# Patient Record
Sex: FEMALE | Race: WHITE | Employment: PART TIME | URBAN - METROPOLITAN AREA
[De-identification: names, ages, dates, MRNs, and addresses within clinical notes are randomized per-mention and may not be internally consistent; named-entity substitution may affect disease eponyms.]

---

## 2017-01-31 ENCOUNTER — OFFICE VISIT (OUTPATIENT)
Dept: FAMILY MEDICINE CLINIC | Facility: CLINIC | Age: 47
End: 2017-01-31

## 2017-01-31 VITALS
SYSTOLIC BLOOD PRESSURE: 110 MMHG | HEART RATE: 102 BPM | RESPIRATION RATE: 16 BRPM | OXYGEN SATURATION: 98 % | DIASTOLIC BLOOD PRESSURE: 64 MMHG | TEMPERATURE: 98 F

## 2017-01-31 DIAGNOSIS — H10.9 CONJUNCTIVITIS OF LEFT EYE, UNSPECIFIED CONJUNCTIVITIS TYPE: Primary | ICD-10-CM

## 2017-01-31 PROCEDURE — 99213 OFFICE O/P EST LOW 20 MIN: CPT | Performed by: PHYSICIAN ASSISTANT

## 2017-01-31 RX ORDER — TOBRAMYCIN 3 MG/ML
SOLUTION/ DROPS OPHTHALMIC
Qty: 5 ML | Refills: 0 | Status: SHIPPED | OUTPATIENT
Start: 2017-01-31 | End: 2017-02-15

## 2017-01-31 NOTE — PROGRESS NOTES
CHIEF COMPLAINT:   No chief complaint on file. HPI:   Gabino Villagomez is a 51year old female who presents with chief complaint of  left \"pink eye\". Symptoms began  since this morning when she woke up. Symptoms have been stabel since onset.    Patient 01/10/2017  GENERAL: well developed, well nourished,in no apparent distress  SKIN: no rashes,no suspicious lesions  EYES: PERRLA, EOMI, + conjunctiva erythematous, injected, + discharge. No skin swelling or erythema around eyes.   intact vision to [employer]

## 2018-03-08 ENCOUNTER — OFFICE VISIT (OUTPATIENT)
Dept: INTEGRATIVE MEDICINE | Facility: HOSPITAL | Age: 48
End: 2018-03-08
Attending: GENERAL ACUTE CARE HOSPITAL

## 2018-03-08 DIAGNOSIS — M25.511 ACUTE PAIN OF RIGHT SHOULDER: Primary | ICD-10-CM

## 2018-03-12 ENCOUNTER — APPOINTMENT (OUTPATIENT)
Dept: INTEGRATIVE MEDICINE | Facility: HOSPITAL | Age: 48
End: 2018-03-12
Attending: GENERAL ACUTE CARE HOSPITAL

## 2018-03-14 NOTE — PROGRESS NOTES
[address] [place] [address] Patient presents right shoulder pain level =7 with sharp shooting pains. Pain is around LI 15 area in the lateral side of the shoulder joint.  Patient shares that she has a bone spur in the neck area a

## 2018-03-15 ENCOUNTER — OFFICE VISIT (OUTPATIENT)
Dept: INTEGRATIVE MEDICINE | Facility: HOSPITAL | Age: 48
End: 2018-03-15
Attending: GENERAL ACUTE CARE HOSPITAL

## 2018-03-15 DIAGNOSIS — M25.511 ACUTE PAIN OF RIGHT SHOULDER: Primary | ICD-10-CM

## 2018-03-16 ENCOUNTER — APPOINTMENT (OUTPATIENT)
Dept: INTEGRATIVE MEDICINE | Facility: HOSPITAL | Age: 48
End: 2018-03-16
Attending: GENERAL ACUTE CARE HOSPITAL

## 2018-03-22 NOTE — PROGRESS NOTES
[address] [facility] Adán came today reporting 15% less pain in the shoulder and her sleep was more rested about 20%.     Objective: Tongue: Pale coat, swollen, scallops and dry coat.   Pulse: JEVON and SP  and KD is deep

## 2019-10-22 PROBLEM — E55.9 VITAMIN D DEFICIENCY: Status: ACTIVE | Noted: 2019-10-22

## 2019-10-30 ENCOUNTER — OFFICE VISIT (OUTPATIENT)
Dept: INTERNAL MEDICINE CLINIC | Facility: CLINIC | Age: 49
End: 2019-10-30
Payer: COMMERCIAL

## 2019-10-30 VITALS
SYSTOLIC BLOOD PRESSURE: 118 MMHG | HEIGHT: 62 IN | WEIGHT: 183 LBS | BODY MASS INDEX: 33.68 KG/M2 | DIASTOLIC BLOOD PRESSURE: 76 MMHG | HEART RATE: 80 BPM | RESPIRATION RATE: 16 BRPM

## 2019-10-30 DIAGNOSIS — E66.9 OBESITY (BMI 30-39.9): ICD-10-CM

## 2019-10-30 DIAGNOSIS — Z51.81 THERAPEUTIC DRUG MONITORING: Primary | ICD-10-CM

## 2019-10-30 PROCEDURE — 93000 ELECTROCARDIOGRAM COMPLETE: CPT | Performed by: INTERNAL MEDICINE

## 2019-10-30 PROCEDURE — 99204 OFFICE O/P NEW MOD 45 MIN: CPT | Performed by: INTERNAL MEDICINE

## 2019-10-30 RX ORDER — PHENTERMINE HYDROCHLORIDE 15 MG/1
15 CAPSULE ORAL EVERY MORNING
Qty: 30 CAPSULE | Refills: 1 | Status: SHIPPED | OUTPATIENT
Start: 2019-10-30 | End: 2020-01-08

## 2019-10-30 NOTE — PATIENT INSTRUCTIONS
Plan:  Continue with medications:   Go to the lab for blood work   Follow up with me in 1 month  Schedule follow up appointments: Santiago Mckay (dietitian) or Gabriel Camejo (dietitian)   Check for insurance coverage for dietitian and labwork prior to sche sodium issues)   -hummus with vegetables  -bean dip with vegetables    FRUIT  Low carb fruit options   Raspberries: Half a cup (60 grams) contains 3 grams of carbs. Blackberries: Half a cup (70 grams) contains 4 grams of carbs.   Strawberries: Half a cup (

## 2019-10-30 NOTE — PROGRESS NOTES
HISTORY OF PRESENT ILLNESS  Patient presents with:  Weight Problem: work for [address], frankie Aggarwal Parrish is a 47year old female new to our office today for initiation of medical weight loss program.  Patient presents today with c/o exces CARDIOVASCULAR: denies chest pain on exertion , denies palpitations or pedal edema  GI: denies abdominal pain.   No N/V/D/C  MUSCULOSKELETAL: denies joint pains   NEURO: denies headaches   PSYCH: denies change in behavior or mood, denies feeling sad or depr Bernard Kwok 168.00 10/15/2019    TRIG 50.00 10/15/2019    HDL 59 10/15/2019    LDL 99 10/15/2019    VLDL 13 03/21/2015    CHOLHDLRATIO 2.4 09/09/2011     Lab Results   Component Value Date    TSH 1.288 10/15/2019     Lab Results   Component Value Date    VITB Schedule follow up appointments: Eli Flores (dietitian) or Lane Serrano (dietitian)   Check for insurance coverage for dietitian and labwork prior to scheduling appointment. Please try to work on the following dietary changes:  1.  Drink lots of gary Low carb fruit options   Raspberries: Half a cup (60 grams) contains 3 grams of carbs. Blackberries: Half a cup (70 grams) contains 4 grams of carbs. Strawberries: Half a cup (100 grams) contains 6 grams of carbs.   Blueberries: Half a cup (50 grams) cont

## 2019-11-12 ENCOUNTER — TELEPHONE (OUTPATIENT)
Dept: INTERNAL MEDICINE CLINIC | Facility: CLINIC | Age: 49
End: 2019-11-12

## 2019-11-12 NOTE — TELEPHONE ENCOUNTER
Patient called back had labs drawn 11/6 at [address] [place] [address] location. [phone]. Called lab, [phone]  To get results faxed. Awaiting results.

## 2019-11-27 ENCOUNTER — TELEPHONE (OUTPATIENT)
Dept: INTERNAL MEDICINE CLINIC | Facility: CLINIC | Age: 49
End: 2019-11-27

## 2019-11-27 NOTE — TELEPHONE ENCOUNTER
Medical records request received from [facility]    Sent to [address] STAT

## 2019-12-17 ENCOUNTER — TELEPHONE (OUTPATIENT)
Dept: INTERNAL MEDICINE CLINIC | Facility: CLINIC | Age: 49
End: 2019-12-17

## 2020-01-02 RX ORDER — PHENTERMINE HYDROCHLORIDE 15 MG/1
CAPSULE ORAL
Qty: 30 CAPSULE | Refills: 0 | OUTPATIENT
Start: 2020-01-02

## 2020-01-02 NOTE — TELEPHONE ENCOUNTER
Requesting Phentermine  LOV: 10/30/19  RTC: one month  Last Relevant Labs: na  Filled: 10/30/19 #30 with 1 refills  Last filled 11[place]29/19 [place]    Future Appointments   Date Time Provider Shemar Vann   1/8/2020  8:20 AM MD GEN Godoy [facility]   2/3/2020  8:00 AM MD GEN Godoy [facility]   3/3/2020  8:00 AM Eva Fink MD EMG[facility]     Patient cancelled her 12/3/19 appointment. Can I deny?

## 2020-01-08 ENCOUNTER — TELEPHONE (OUTPATIENT)
Dept: INTERNAL MEDICINE CLINIC | Facility: CLINIC | Age: 50
End: 2020-01-08

## 2020-01-08 ENCOUNTER — OFFICE VISIT (OUTPATIENT)
Dept: INTERNAL MEDICINE CLINIC | Facility: CLINIC | Age: 50
End: 2020-01-08
Payer: COMMERCIAL

## 2020-01-08 VITALS
RESPIRATION RATE: 16 BRPM | SYSTOLIC BLOOD PRESSURE: 120 MMHG | DIASTOLIC BLOOD PRESSURE: 80 MMHG | BODY MASS INDEX: 30.91 KG/M2 | HEART RATE: 80 BPM | HEIGHT: 62 IN | WEIGHT: 168 LBS

## 2020-01-08 DIAGNOSIS — Z51.81 THERAPEUTIC DRUG MONITORING: Primary | ICD-10-CM

## 2020-01-08 DIAGNOSIS — M25.511 RIGHT SHOULDER PAIN, UNSPECIFIED CHRONICITY: ICD-10-CM

## 2020-01-08 DIAGNOSIS — E66.9 OBESITY (BMI 30-39.9): ICD-10-CM

## 2020-01-08 DIAGNOSIS — Z72.3 LACK OF PHYSICAL EXERCISE: ICD-10-CM

## 2020-01-08 DIAGNOSIS — M54.6 RIGHT-SIDED THORACIC BACK PAIN, UNSPECIFIED CHRONICITY: ICD-10-CM

## 2020-01-08 PROCEDURE — 99214 OFFICE O/P EST MOD 30 MIN: CPT | Performed by: INTERNAL MEDICINE

## 2020-01-08 RX ORDER — PHENTERMINE HYDROCHLORIDE 15 MG/1
15 CAPSULE ORAL EVERY MORNING
Qty: 30 CAPSULE | Refills: 0 | Status: SHIPPED | OUTPATIENT
Start: 2020-01-08 | End: 2020-02-03

## 2020-01-08 NOTE — PROGRESS NOTES
HISTORY OF PRESENT ILLNESS  Patient presents with:  Weight Check: down 15 lb      Rock Madsen is a 47year old female here for follow up in medical weight loss program.     Denies chest pain, shortness of breath, dizziness, blurred vision, headache, pare 93 09/09/2011    GFRAA 108 09/09/2011    CA 9.5 09/09/2011    ALKPHO 73 10/15/2019    AST 20 10/15/2019    ALT 42 10/15/2019    BILT 0.47 10/15/2019    TP 7.2 10/15/2019    ALB 3.5 10/15/2019    GLOBULT 2.7 09/09/2011    AGRATIO 1.7 03/21/2015    ALBGLOBRA recommended. · Discussed the role of sleep and stress in weight management. · Counseled on comprehensive weight loss plan including attention to nutrition, exercise and behavior/stress management for success.  See patient instruction below for more detail

## 2020-01-08 NOTE — TELEPHONE ENCOUNTER
[1/8/2020 8:59 AM] Cass Steen:   Keren Jamil, [id number]  Female, 47year old, 09/24/1970   I forgot to discuss fitness with her  fitnessblender. com or fitsugar. com ( free)   barre[employer] (paid online)   can you send her a mychart with the websites

## 2020-02-03 ENCOUNTER — OFFICE VISIT (OUTPATIENT)
Dept: INTERNAL MEDICINE CLINIC | Facility: CLINIC | Age: 50
End: 2020-02-03
Payer: COMMERCIAL

## 2020-02-03 VITALS
RESPIRATION RATE: 16 BRPM | HEART RATE: 76 BPM | BODY MASS INDEX: 30 KG/M2 | WEIGHT: 163 LBS | SYSTOLIC BLOOD PRESSURE: 116 MMHG | DIASTOLIC BLOOD PRESSURE: 82 MMHG | HEIGHT: 62 IN

## 2020-02-03 DIAGNOSIS — Z51.81 THERAPEUTIC DRUG MONITORING: Primary | ICD-10-CM

## 2020-02-03 DIAGNOSIS — E66.9 OBESITY (BMI 30-39.9): ICD-10-CM

## 2020-02-03 PROCEDURE — 99214 OFFICE O/P EST MOD 30 MIN: CPT | Performed by: INTERNAL MEDICINE

## 2020-02-03 RX ORDER — PHENTERMINE HYDROCHLORIDE 15 MG/1
15 CAPSULE ORAL 2 TIMES DAILY
Qty: 60 CAPSULE | Refills: 0 | Status: SHIPPED | OUTPATIENT
Start: 2020-02-03 | End: 2020-03-03

## 2020-02-03 NOTE — PROGRESS NOTES
HISTORY OF PRESENT ILLNESS  Patient presents with:  Weight Check: down 5 lbs      Raciel White is a 47year old female here for follow up in medical weight loss program.     Denies chest pain, shortness of breath, dizziness, blurred vision, headache, pare 10/15/2019    ALT 42 10/15/2019    BILT 0.47 10/15/2019    TP 7.2 10/15/2019    ALB 3.5 10/15/2019    GLOBULT 2.7 09/09/2011    AGRATIO 1.7 03/21/2015    ALBGLOBRAT 1.6 09/09/2011     10/15/2019    K 4.40 10/15/2019     10/15/2019    CO2 26.4 1 and adverse effects of this medication  · Reviewed methods to incorporate exercise   · Nutrition: low carb diet/ recommended to eat breakfast daily/ regular protein intake  · Medication use and side effects reviewed with patient.   Medication contraindicati

## 2020-03-03 ENCOUNTER — OFFICE VISIT (OUTPATIENT)
Dept: INTERNAL MEDICINE CLINIC | Facility: CLINIC | Age: 50
End: 2020-03-03
Payer: COMMERCIAL

## 2020-03-03 VITALS
HEIGHT: 62 IN | HEART RATE: 88 BPM | DIASTOLIC BLOOD PRESSURE: 70 MMHG | BODY MASS INDEX: 28.89 KG/M2 | WEIGHT: 157 LBS | SYSTOLIC BLOOD PRESSURE: 102 MMHG

## 2020-03-03 DIAGNOSIS — E66.9 OBESITY (BMI 30-39.9): ICD-10-CM

## 2020-03-03 DIAGNOSIS — Z51.81 THERAPEUTIC DRUG MONITORING: Primary | ICD-10-CM

## 2020-03-03 PROCEDURE — 99213 OFFICE O/P EST LOW 20 MIN: CPT | Performed by: INTERNAL MEDICINE

## 2020-03-03 RX ORDER — PHENTERMINE HYDROCHLORIDE 15 MG/1
15 CAPSULE ORAL 2 TIMES DAILY
Qty: 60 CAPSULE | Refills: 1 | Status: SHIPPED | OUTPATIENT
Start: 2020-03-03 | End: 2020-05-08

## 2020-03-03 NOTE — PROGRESS NOTES
HISTORY OF PRESENT ILLNESS  Patient presents with:  Weight Check: 6# down      Yeni Body is a 47year old female here for follow up in medical weight loss program.     Denies chest pain, shortness of breath, dizziness, blurred vision, headache, paresth ALT 42 10/15/2019    BILT 0.47 10/15/2019    TP 7.2 10/15/2019    ALB 3.5 10/15/2019    GLOBULT 2.7 09/09/2011    AGRATIO 1.7 03/21/2015    ALBGLOBRAT 1.6 09/09/2011     10/15/2019    K 4.40 10/15/2019     10/15/2019    CO2 26.4 10/15/2019 exercise and behavior/stress management for success. See patient instruction below for more details.   · Discussed strategies to overcome barriers to successful weight loss and weight maintenance  · FITTE: ACSM recommendations (150-300 minutes/ week in acti

## 2020-05-08 ENCOUNTER — VIRTUAL PHONE E/M (OUTPATIENT)
Dept: INTERNAL MEDICINE CLINIC | Facility: CLINIC | Age: 50
End: 2020-05-08

## 2020-05-08 DIAGNOSIS — Z51.81 THERAPEUTIC DRUG MONITORING: Primary | ICD-10-CM

## 2020-05-08 DIAGNOSIS — E66.9 OBESITY (BMI 30-39.9): ICD-10-CM

## 2020-05-08 PROCEDURE — 99213 OFFICE O/P EST LOW 20 MIN: CPT | Performed by: INTERNAL MEDICINE

## 2020-05-08 RX ORDER — PHENTERMINE HYDROCHLORIDE 30 MG/1
30 CAPSULE ORAL EVERY MORNING
Qty: 30 CAPSULE | Refills: 1 | Status: SHIPPED | OUTPATIENT
Start: 2020-05-08 | End: 2020-05-08

## 2020-05-08 RX ORDER — PHENTERMINE HYDROCHLORIDE 15 MG/1
15 CAPSULE ORAL EVERY MORNING
Qty: 60 CAPSULE | Refills: 1 | Status: SHIPPED | OUTPATIENT
Start: 2020-05-08 | End: 2020-09-03

## 2020-05-08 NOTE — PROGRESS NOTES
[facility] Weight Management check in/follow up encounter  Virtual/Telephone Check-In    Edvin Current verbally consents to a Virtual/Telephone Check-In service on 05/08/20  Working from home currently since March.   Has been working from home for last

## 2020-05-08 NOTE — PATIENT INSTRUCTIONS
[employer] and [employer] live  Stay home stay fit!        Kyle Raman -Be your Best - youtube    "[employer]n jose

## 2020-06-26 DIAGNOSIS — Z78.9 PARTICIPANT IN HEALTH AND WELLNESS PLAN: Primary | ICD-10-CM

## 2020-07-03 ENCOUNTER — VIRTUAL PHONE E/M (OUTPATIENT)
Dept: INTERNAL MEDICINE CLINIC | Facility: CLINIC | Age: 50
End: 2020-07-03

## 2020-07-03 DIAGNOSIS — Z51.81 THERAPEUTIC DRUG MONITORING: Primary | ICD-10-CM

## 2020-07-03 DIAGNOSIS — E66.9 OBESITY (BMI 30-39.9): ICD-10-CM

## 2020-07-03 PROCEDURE — 99213 OFFICE O/P EST LOW 20 MIN: CPT | Performed by: PHYSICIAN ASSISTANT

## 2020-07-03 RX ORDER — PHENTERMINE HYDROCHLORIDE 30 MG/1
30 CAPSULE ORAL EVERY MORNING
Qty: 30 CAPSULE | Refills: 1 | Status: SHIPPED | OUTPATIENT
Start: 2020-07-03 | End: 2020-09-03

## 2020-07-03 NOTE — PROGRESS NOTES
Virtual Telephone Check-In    Nay Quijano verbally consents to a Virtual/Telephone Check-In visit on 7/3/2020. Patient understands and accepts financial responsibility for any deductible, co-insurance and/or co-pays associated with this service.     Du monitoring    Obesity (BMI 30-39. 9)    Other orders  -     Phentermine HCl 30 MG Oral Cap; Take 1 capsule (30 mg total) by mouth every morning.       Continue with medications:   · Will continue phentermine  · --advised of side effects and adverse effects o

## 2020-09-03 ENCOUNTER — OFFICE VISIT (OUTPATIENT)
Dept: INTERNAL MEDICINE CLINIC | Facility: CLINIC | Age: 50
End: 2020-09-03
Payer: COMMERCIAL

## 2020-09-03 VITALS
RESPIRATION RATE: 16 BRPM | BODY MASS INDEX: 25.76 KG/M2 | SYSTOLIC BLOOD PRESSURE: 116 MMHG | WEIGHT: 140 LBS | TEMPERATURE: 96 F | HEART RATE: 102 BPM | DIASTOLIC BLOOD PRESSURE: 76 MMHG | HEIGHT: 62 IN

## 2020-09-03 DIAGNOSIS — E66.9 OBESITY (BMI 30-39.9): ICD-10-CM

## 2020-09-03 DIAGNOSIS — Z51.81 THERAPEUTIC DRUG MONITORING: Primary | ICD-10-CM

## 2020-09-03 PROCEDURE — 3008F BODY MASS INDEX DOCD: CPT | Performed by: INTERNAL MEDICINE

## 2020-09-03 PROCEDURE — 99213 OFFICE O/P EST LOW 20 MIN: CPT | Performed by: INTERNAL MEDICINE

## 2020-09-03 PROCEDURE — 3074F SYST BP LT 130 MM HG: CPT | Performed by: INTERNAL MEDICINE

## 2020-09-03 PROCEDURE — 3078F DIAST BP <80 MM HG: CPT | Performed by: INTERNAL MEDICINE

## 2020-09-03 RX ORDER — PHENTERMINE HYDROCHLORIDE 15 MG/1
15 CAPSULE ORAL EVERY MORNING
Qty: 60 CAPSULE | Refills: 1 | Status: CANCELLED | OUTPATIENT
Start: 2020-09-03

## 2020-09-03 RX ORDER — PHENTERMINE HYDROCHLORIDE 30 MG/1
30 CAPSULE ORAL EVERY MORNING
Qty: 30 CAPSULE | Refills: 1 | Status: CANCELLED | OUTPATIENT
Start: 2020-09-03

## 2020-09-03 RX ORDER — PHENTERMINE HYDROCHLORIDE 37.5 MG/1
37.5 TABLET ORAL
Qty: 30 TABLET | Refills: 1 | Status: SHIPPED | OUTPATIENT
Start: 2020-09-03 | End: 2020-11-04

## 2020-09-03 NOTE — PROGRESS NOTES
HISTORY OF PRESENT ILLNESS  Patient presents with:  Weight Check: lost 17 pounds      Sean Joseph is a 47year old female here for follow up in medical weight loss program.     Denies chest pain, shortness of breath, dizziness, blurred vision, headache, 10/15/2019    GFR >59 11/30/2009    GFRNAA 93 09/09/2011    GFRAA 108 09/09/2011    CA 9.5 09/09/2011    ALKPHO 73 10/15/2019    AST 20 10/15/2019    ALT 42 10/15/2019    BILT 0.47 10/15/2019    TP 7.2 10/15/2019    ALB 3.5 10/15/2019    GLOBULT 2.7 09/09/ effects and adverse effects of this medication  · Discussed home workout options / as well strength training  · Reviewed methods to incorporate exercise, currently at once per day  · Nutrition: low carb diet/ recommended to eat breakfast daily/ regular pro

## 2020-11-04 ENCOUNTER — OFFICE VISIT (OUTPATIENT)
Dept: INTERNAL MEDICINE CLINIC | Facility: CLINIC | Age: 50
End: 2020-11-04
Payer: COMMERCIAL

## 2020-11-04 VITALS
HEART RATE: 80 BPM | RESPIRATION RATE: 14 BRPM | HEIGHT: 63 IN | WEIGHT: 137 LBS | SYSTOLIC BLOOD PRESSURE: 120 MMHG | DIASTOLIC BLOOD PRESSURE: 68 MMHG | BODY MASS INDEX: 24.27 KG/M2

## 2020-11-04 DIAGNOSIS — E66.9 OBESITY (BMI 30-39.9): ICD-10-CM

## 2020-11-04 DIAGNOSIS — Z51.81 THERAPEUTIC DRUG MONITORING: Primary | ICD-10-CM

## 2020-11-04 PROCEDURE — 3008F BODY MASS INDEX DOCD: CPT | Performed by: INTERNAL MEDICINE

## 2020-11-04 PROCEDURE — 99072 ADDL SUPL MATRL&STAF TM PHE: CPT | Performed by: INTERNAL MEDICINE

## 2020-11-04 PROCEDURE — 99213 OFFICE O/P EST LOW 20 MIN: CPT | Performed by: INTERNAL MEDICINE

## 2020-11-04 PROCEDURE — 3078F DIAST BP <80 MM HG: CPT | Performed by: INTERNAL MEDICINE

## 2020-11-04 PROCEDURE — 3074F SYST BP LT 130 MM HG: CPT | Performed by: INTERNAL MEDICINE

## 2020-11-04 RX ORDER — PHENTERMINE HYDROCHLORIDE 37.5 MG/1
37.5 TABLET ORAL
Qty: 30 TABLET | Refills: 1 | Status: SHIPPED | OUTPATIENT
Start: 2020-11-04 | End: 2021-01-06

## 2020-11-04 NOTE — PROGRESS NOTES
HISTORY OF PRESENT ILLNESS  Patient presents with:  Weight Check: down 3      Ita Seo is a 45year old female here for follow up in medical weight loss program.     Denies chest pain, shortness of breath, dizziness, blurred vision, headache, parest ALB 3.5 10/15/2019    GLOBULT 2.7 09/09/2011    AGRATIO 1.7 03/21/2015    ALBGLOBRAT 1.6 09/09/2011     10/15/2019    K 4.40 10/15/2019     10/15/2019    CO2 26.4 10/15/2019     No results found for: EAG, A1C  Lab Results   Component Value D in weight management. · Counseled on comprehensive weight loss plan including attention to nutrition, exercise and behavior/stress management for success. See patient instruction below for more details.   · Discussed strategies to overcome barriers to succ

## 2020-12-19 ENCOUNTER — IMMUNIZATION (OUTPATIENT)
Dept: LAB | Facility: HOSPITAL | Age: 50
End: 2020-12-19
Attending: PREVENTIVE MEDICINE
Payer: COMMERCIAL

## 2020-12-19 DIAGNOSIS — Z23 NEED FOR VACCINATION: ICD-10-CM

## 2020-12-19 PROCEDURE — 0001A PFIZER-BIONTECH COVID-19 VACCINE: CPT

## 2021-01-06 ENCOUNTER — OFFICE VISIT (OUTPATIENT)
Dept: INTERNAL MEDICINE CLINIC | Facility: CLINIC | Age: 51
End: 2021-01-06
Payer: COMMERCIAL

## 2021-01-06 VITALS
HEART RATE: 84 BPM | BODY MASS INDEX: 25.76 KG/M2 | WEIGHT: 140 LBS | DIASTOLIC BLOOD PRESSURE: 78 MMHG | HEIGHT: 62 IN | SYSTOLIC BLOOD PRESSURE: 120 MMHG | RESPIRATION RATE: 16 BRPM

## 2021-01-06 DIAGNOSIS — Z51.81 THERAPEUTIC DRUG MONITORING: Primary | ICD-10-CM

## 2021-01-06 DIAGNOSIS — E66.9 OBESITY (BMI 30-39.9): ICD-10-CM

## 2021-01-06 PROCEDURE — 99213 OFFICE O/P EST LOW 20 MIN: CPT | Performed by: INTERNAL MEDICINE

## 2021-01-06 PROCEDURE — 3008F BODY MASS INDEX DOCD: CPT | Performed by: INTERNAL MEDICINE

## 2021-01-06 PROCEDURE — 3074F SYST BP LT 130 MM HG: CPT | Performed by: INTERNAL MEDICINE

## 2021-01-06 PROCEDURE — 3078F DIAST BP <80 MM HG: CPT | Performed by: INTERNAL MEDICINE

## 2021-01-06 RX ORDER — PHENTERMINE HYDROCHLORIDE 37.5 MG/1
37.5 TABLET ORAL
Qty: 30 TABLET | Refills: 1 | Status: SHIPPED | OUTPATIENT
Start: 2021-01-06 | End: 2021-04-27

## 2021-01-06 NOTE — PROGRESS NOTES
HISTORY OF PRESENT ILLNESS  Patient presents with:  Weight Check: up 3 lb      Meredith Bush is a 45year old female here for follow up in medical weight loss program.     Denies chest pain, shortness of breath, dizziness, blurred vision, headache, pares Lab Results   Component Value Date    GLU 88 11/17/2020    BUN 13.0 11/17/2020    BUNCREA 19.0 11/17/2020    CREATSERUM 0.69 11/17/2020    GFR >59 11/30/2009    GFRNAA 93 09/09/2011    GFRAA 108 09/09/2011    CA 9.4 11/17/2020    ALKPHO 82 11/17/2020 doing amazing even with high stress and outside factors. · Congratulated patient and advised to keep up the awesome work!   · Reviewed dietitian referral   · [facility] working on more protein, fiber, fruits, vegetables  · Nutrition: low carb

## 2021-01-06 NOTE — PATIENT INSTRUCTIONS
These macronutrient values reflect your maintenance calories of 1,580 calories per day.       Moderate Carb (30/35/35)  118g  protein  61g  fats  138g  carbs    Lower Carb (40/40/20)  158g  protein  70g  fats  79g  carbs    Higher Carb (30/20/50)  118g  pro

## 2021-01-09 ENCOUNTER — IMMUNIZATION (OUTPATIENT)
Dept: LAB | Facility: HOSPITAL | Age: 51
End: 2021-01-09
Attending: PREVENTIVE MEDICINE

## 2021-01-09 DIAGNOSIS — Z23 NEED FOR VACCINATION: ICD-10-CM

## 2021-01-09 PROCEDURE — 0002A SARSCOV2 VAC 30MCG/0.3ML IM: CPT

## 2021-01-16 DIAGNOSIS — E66.9 OBESITY (BMI 30-39.9): ICD-10-CM

## 2021-01-16 DIAGNOSIS — Z51.81 THERAPEUTIC DRUG MONITORING: ICD-10-CM

## 2021-01-17 RX ORDER — PHENTERMINE HYDROCHLORIDE 37.5 MG/1
TABLET ORAL
Qty: 30 TABLET | Refills: 0 | OUTPATIENT
Start: 2021-01-17

## 2021-01-17 NOTE — TELEPHONE ENCOUNTER
Requesting Phentermine 37.5 mg  LOV: 1/6/21  RTC: 2 months  Last Relevant Labs: na  Filled: 1/6/21 #30 with 1 refills    Future Appointments   Date Time Provider Shemar Vann   2/16/2021  1:40 PM Ned Cornejo MD [facility]     Receipt confirme

## 2021-04-27 ENCOUNTER — OFFICE VISIT (OUTPATIENT)
Dept: INTERNAL MEDICINE CLINIC | Facility: CLINIC | Age: 51
End: 2021-04-27
Payer: COMMERCIAL

## 2021-04-27 VITALS
HEART RATE: 93 BPM | BODY MASS INDEX: 27.97 KG/M2 | SYSTOLIC BLOOD PRESSURE: 118 MMHG | OXYGEN SATURATION: 98 % | TEMPERATURE: 97 F | WEIGHT: 152 LBS | DIASTOLIC BLOOD PRESSURE: 70 MMHG | RESPIRATION RATE: 16 BRPM | HEIGHT: 62 IN

## 2021-04-27 DIAGNOSIS — E66.9 OBESITY (BMI 30-39.9): ICD-10-CM

## 2021-04-27 DIAGNOSIS — Z51.81 THERAPEUTIC DRUG MONITORING: Primary | ICD-10-CM

## 2021-04-27 DIAGNOSIS — Z51.81 THERAPEUTIC DRUG MONITORING: ICD-10-CM

## 2021-04-27 PROCEDURE — 3078F DIAST BP <80 MM HG: CPT | Performed by: INTERNAL MEDICINE

## 2021-04-27 PROCEDURE — 3074F SYST BP LT 130 MM HG: CPT | Performed by: INTERNAL MEDICINE

## 2021-04-27 PROCEDURE — 3008F BODY MASS INDEX DOCD: CPT | Performed by: INTERNAL MEDICINE

## 2021-04-27 PROCEDURE — 99213 OFFICE O/P EST LOW 20 MIN: CPT | Performed by: INTERNAL MEDICINE

## 2021-04-27 RX ORDER — PHENTERMINE HYDROCHLORIDE 37.5 MG/1
TABLET ORAL
Qty: 30 TABLET | Refills: 0 | Status: SHIPPED | OUTPATIENT
Start: 2021-04-27 | End: 2021-05-26

## 2021-04-27 NOTE — PROGRESS NOTES
HISTORY OF PRESENT ILLNESS  Patient presents with:  Weight Check: up 12 pounds      Chaya Quintanilla is a 45year old female here for follow up in medical weight loss program.     Denies chest pain, shortness of breath, dizziness, blurred vision, headache, GFR >59 11/30/2009    GFRNAA 93 09/09/2011    GFRAA 108 09/09/2011    CA 9.4 11/17/2020    ALKPHO 82 11/17/2020    AST 19 11/17/2020    ALT 13 11/17/2020    BILT 0.59 11/17/2020    TP 7.3 11/17/2020    ALB 4.3 11/17/2020    GLOBULT 2.7 09/09/2011    AGRATI · Follow up with dietitian and psychologist as recommended. · Discussed the role of sleep and stress in weight management.   · Counseled on comprehensive weight loss plan including attention to nutrition, exercise and behavior/stress management for succe

## 2021-04-27 NOTE — TELEPHONE ENCOUNTER
Patient was seen today and it is noted to restart phentermine 37.5 mg. Not sent yet.   Forwarded to Dr. Steven Mtz

## 2021-05-26 DIAGNOSIS — E66.9 OBESITY (BMI 30-39.9): ICD-10-CM

## 2021-05-26 DIAGNOSIS — Z51.81 THERAPEUTIC DRUG MONITORING: ICD-10-CM

## 2021-05-26 RX ORDER — PHENTERMINE HYDROCHLORIDE 37.5 MG/1
TABLET ORAL
Qty: 30 TABLET | Refills: 0 | Status: SHIPPED | OUTPATIENT
Start: 2021-05-26 | End: 2021-06-15

## 2021-05-26 NOTE — TELEPHONE ENCOUNTER
Requesting Phentermine 37.5 mg  LOV: 4/27/21  RTC: 2 months  Last Relevant Labs: na  Filled: 4/27/21 #30 with 0 refills last filled 2[place]25/21 [place]    Future Appointments   Date Time Provider Shemar Vann   6/15/2021 12:20 PM Haydee Nunes

## 2021-06-15 ENCOUNTER — OFFICE VISIT (OUTPATIENT)
Dept: INTERNAL MEDICINE CLINIC | Facility: CLINIC | Age: 51
End: 2021-06-15
Payer: COMMERCIAL

## 2021-06-15 VITALS
RESPIRATION RATE: 16 BRPM | HEART RATE: 86 BPM | SYSTOLIC BLOOD PRESSURE: 118 MMHG | HEIGHT: 62 IN | DIASTOLIC BLOOD PRESSURE: 74 MMHG | BODY MASS INDEX: 27.42 KG/M2 | WEIGHT: 149 LBS

## 2021-06-15 DIAGNOSIS — Z51.81 THERAPEUTIC DRUG MONITORING: Primary | ICD-10-CM

## 2021-06-15 DIAGNOSIS — E66.9 OBESITY (BMI 30-39.9): ICD-10-CM

## 2021-06-15 DIAGNOSIS — Z72.3 LACK OF PHYSICAL EXERCISE: ICD-10-CM

## 2021-06-15 PROCEDURE — 3074F SYST BP LT 130 MM HG: CPT | Performed by: INTERNAL MEDICINE

## 2021-06-15 PROCEDURE — 99213 OFFICE O/P EST LOW 20 MIN: CPT | Performed by: INTERNAL MEDICINE

## 2021-06-15 PROCEDURE — 3078F DIAST BP <80 MM HG: CPT | Performed by: INTERNAL MEDICINE

## 2021-06-15 PROCEDURE — 3008F BODY MASS INDEX DOCD: CPT | Performed by: INTERNAL MEDICINE

## 2021-06-15 RX ORDER — PHENTERMINE HYDROCHLORIDE 37.5 MG/1
37.5 TABLET ORAL
Qty: 30 TABLET | Refills: 1 | Status: SHIPPED | OUTPATIENT
Start: 2021-06-15 | End: 2021-10-08

## 2021-06-15 NOTE — PROGRESS NOTES
HISTORY OF PRESENT ILLNESS  Patient presents with:  Weight Check: down 3 lbs      Kirt Doran is a 45year old female here for follow up in medical weight loss program.     Denies chest pain, shortness of breath, dizziness, blurred vision, headache, pa 11/17/2020    ALKPHO 82 11/17/2020    AST 19 11/17/2020    ALT 13 11/17/2020    BILT 0.59 11/17/2020    TP 7.3 11/17/2020    ALB 4.3 11/17/2020    GLOBULT 2.7 09/09/2011    AGRATIO 1.7 03/21/2015    ALBGLOBRAT 1.6 09/09/2011     11/17/2020    K 4.05 psychologist as recommended. · Discussed the role of sleep and stress in weight management. · Counseled on comprehensive weight loss plan including attention to nutrition, exercise and behavior/stress management for success.  See patient instruction below

## 2021-08-18 ENCOUNTER — TELEPHONE (OUTPATIENT)
Dept: INTERNAL MEDICINE CLINIC | Facility: HOSPITAL | Age: 51
End: 2021-08-18

## 2021-08-18 ENCOUNTER — TELEMEDICINE (OUTPATIENT)
Dept: INTERNAL MEDICINE CLINIC | Facility: CLINIC | Age: 51
End: 2021-08-18

## 2021-08-18 DIAGNOSIS — E66.9 OBESITY (BMI 30-39.9): ICD-10-CM

## 2021-08-18 DIAGNOSIS — Z20.822 SUSPECTED COVID-19 VIRUS INFECTION: Primary | ICD-10-CM

## 2021-08-18 DIAGNOSIS — Z51.81 THERAPEUTIC DRUG MONITORING: Primary | ICD-10-CM

## 2021-08-18 PROCEDURE — 99213 OFFICE O/P EST LOW 20 MIN: CPT | Performed by: INTERNAL MEDICINE

## 2021-08-18 RX ORDER — LIRAGLUTIDE 6 MG/ML
3 INJECTION, SOLUTION SUBCUTANEOUS DAILY
Qty: 5 EACH | Refills: 2 | Status: SHIPPED | OUTPATIENT
Start: 2021-08-18 | End: 2021-09-17

## 2021-08-18 RX ORDER — PEN NEEDLE, DIABETIC 30 GX3/16"
1 NEEDLE, DISPOSABLE MISCELLANEOUS DAILY
Qty: 90 EACH | Refills: 2 | Status: SHIPPED | OUTPATIENT
Start: 2021-08-18 | End: 2021-09-17

## 2021-08-18 NOTE — PATIENT INSTRUCTIONS
Saxenda dosing     Week 1- 0.6mg daily. Week 2- 1.2mg daily. Week 3- 1.8mg daily. Week 4- 2.4mg daily. Week 5- 3mg daily.       [email or web address]

## 2021-08-18 NOTE — PROGRESS NOTES
HISTORY OF PRESENT ILLNESS  Patient presents with:  Weight Check: video      Yeni Mena is a 45year old female here for follow up in medical weight loss program.     Denies chest pain, shortness of breath, dizziness, blurred vision, headache, paresth GFRAA 108 09/09/2011    CA 9.4 11/17/2020    ALKPHO 82 11/17/2020    AST 19 11/17/2020    ALT 13 11/17/2020    BILT 0.59 11/17/2020    TP 7.3 11/17/2020    ALB 4.3 11/17/2020    GLOBULT 2.7 09/09/2011    AGRATIO 1.7 03/21/2015    ALBGLOBRAT 1.6 09/09/2011 more protein, fiber, fruits, vegetables  · Nutrition: low carb diet/ recommended to eat breakfast daily/ regular protein intake  · Medication use and side effects reviewed with patient.   Medication contraindications: n/a  · Fitness resources given   · [employer]

## 2021-08-18 NOTE — TELEPHONE ENCOUNTER
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 8/23/21    Did you have close contact with someone on your unit while not wearing a mask? (e.g., during meal breaks):  Yes []   No [x]    If yes, who:   Do you share a workspace? Yes []   No [x]       If yes, with whom?    Do you hav COVID-19 testing ordered: [] Rapid    [x] Alinity    Date test is to be taken:    8/19/21    []  Outside testing       [x] Manager notified    INSTRUCTIONS PROVIDED:    [x]Employee was instructed to call Central scheduling at [phone] or use

## 2021-08-19 ENCOUNTER — NURSE ONLY (OUTPATIENT)
Dept: LAB | Facility: HOSPITAL | Age: 51
End: 2021-08-19
Attending: PREVENTIVE MEDICINE
Payer: COMMERCIAL

## 2021-08-19 DIAGNOSIS — Z20.822 SUSPECTED COVID-19 VIRUS INFECTION: ICD-10-CM

## 2021-08-20 LAB — SARS-COV-2 RNA RESP QL NAA+PROBE: NOT DETECTED

## 2021-08-21 NOTE — TELEPHONE ENCOUNTER
Results and RTW guidelines:    COVID RESULT GIVEN:      Test type:    [] Rapid         [x] Alinity      [x] NEGATIVE     Ordered Alinity retest?  []Yes   [x] No (skip to RTW)       Date ordered:             Dated to be taken:      If Yes, PLACE ORDER NOW instruction from hotline with Alinity results  INSTRUCTIONS PROVIDED:  [x]  Plan as noted above  []  Length of time to obtain results  []  Quarantine instructions  []  S/S of worsening infection/condition and importance of prompt medical re-evaluation incl

## 2021-09-13 ENCOUNTER — TELEPHONE (OUTPATIENT)
Dept: INTERNAL MEDICINE CLINIC | Facility: CLINIC | Age: 51
End: 2021-09-13

## 2021-09-13 NOTE — TELEPHONE ENCOUNTER
Pt called to [facility] nurse visit for injection teaching  Called pt and left voicemail

## 2021-09-15 ENCOUNTER — NURSE ONLY (OUTPATIENT)
Dept: INTERNAL MEDICINE CLINIC | Facility: CLINIC | Age: 51
End: 2021-09-15
Payer: COMMERCIAL

## 2021-11-11 ENCOUNTER — OFFICE VISIT (OUTPATIENT)
Dept: INTERNAL MEDICINE CLINIC | Facility: CLINIC | Age: 51
End: 2021-11-11
Payer: COMMERCIAL

## 2021-11-11 VITALS
RESPIRATION RATE: 16 BRPM | HEART RATE: 78 BPM | WEIGHT: 145 LBS | SYSTOLIC BLOOD PRESSURE: 118 MMHG | HEIGHT: 62 IN | BODY MASS INDEX: 26.68 KG/M2 | DIASTOLIC BLOOD PRESSURE: 68 MMHG

## 2021-11-11 DIAGNOSIS — F32.1 CURRENT MODERATE EPISODE OF MAJOR DEPRESSIVE DISORDER WITHOUT PRIOR EPISODE (HCC): ICD-10-CM

## 2021-11-11 DIAGNOSIS — Z51.81 THERAPEUTIC DRUG MONITORING: Primary | ICD-10-CM

## 2021-11-11 DIAGNOSIS — E66.9 OBESITY (BMI 30-39.9): ICD-10-CM

## 2021-11-11 PROCEDURE — 3074F SYST BP LT 130 MM HG: CPT | Performed by: INTERNAL MEDICINE

## 2021-11-11 PROCEDURE — 99214 OFFICE O/P EST MOD 30 MIN: CPT | Performed by: INTERNAL MEDICINE

## 2021-11-11 PROCEDURE — 3008F BODY MASS INDEX DOCD: CPT | Performed by: INTERNAL MEDICINE

## 2021-11-11 PROCEDURE — 3078F DIAST BP <80 MM HG: CPT | Performed by: INTERNAL MEDICINE

## 2021-11-11 RX ORDER — ALPRAZOLAM 0.25 MG/1
0.25 TABLET ORAL 2 TIMES DAILY PRN
Qty: 60 TABLET | Refills: 0 | Status: SHIPPED | OUTPATIENT
Start: 2021-11-11 | End: 2021-12-30

## 2021-11-11 RX ORDER — SEMAGLUTIDE 1 MG/.5ML
1 INJECTION, SOLUTION SUBCUTANEOUS WEEKLY
Qty: 2 ML | Refills: 0 | Status: SHIPPED | OUTPATIENT
Start: 2021-11-11 | End: 2021-12-30

## 2021-11-11 RX ORDER — BUPROPION HYDROCHLORIDE 150 MG/1
150 TABLET ORAL DAILY
Qty: 30 TABLET | Refills: 1 | Status: SHIPPED | OUTPATIENT
Start: 2021-11-11 | End: 2022-01-17

## 2021-11-11 NOTE — PROGRESS NOTES
Patient teaching on [facility] E[facility]OTT performed. Patient demonstrated back, all questions were answered and patient verbalized understanding.  TASHA

## 2021-11-11 NOTE — PROGRESS NOTES
HISTORY OF PRESENT ILLNESS  Patient presents with:  Weight Check: down 4 lb      Greta Mckeon is a 45year old female here for follow up in medical weight loss program.     Denies chest pain, shortness of breath, dizziness, blurred vision, headache, par GFRNAA 93 09/09/2011    GFRAA 108 09/09/2011    CA 9.4 10/08/2021    ALKPHO 83 10/08/2021    AST 16 10/08/2021    ALT 14 10/08/2021    BILT 0.28 10/08/2021    TP 6.9 10/08/2021    ALB 4.5 10/08/2021    GLOBULIN 2.7 09/09/2011    AGRATIO 1.7 03/21/2015 · Trial of wegovy   · -advised of side effects and adverse effects of this medication  · Trial of wellbutrin xl 150 mg q day   · -advised of side effects and adverse effects of this medication  · Also add xanax prn anxiety / sleep to get patient back on

## 2021-11-30 ENCOUNTER — OFFICE VISIT (OUTPATIENT)
Dept: INTERNAL MEDICINE CLINIC | Facility: CLINIC | Age: 51
End: 2021-11-30
Payer: COMMERCIAL

## 2021-11-30 VITALS
HEIGHT: 62 IN | BODY MASS INDEX: 26.5 KG/M2 | SYSTOLIC BLOOD PRESSURE: 114 MMHG | WEIGHT: 144 LBS | HEART RATE: 82 BPM | RESPIRATION RATE: 16 BRPM | DIASTOLIC BLOOD PRESSURE: 68 MMHG

## 2021-11-30 DIAGNOSIS — Z51.81 THERAPEUTIC DRUG MONITORING: Primary | ICD-10-CM

## 2021-11-30 DIAGNOSIS — E66.9 OBESITY (BMI 30-39.9): ICD-10-CM

## 2021-11-30 DIAGNOSIS — F32.1 CURRENT MODERATE EPISODE OF MAJOR DEPRESSIVE DISORDER WITHOUT PRIOR EPISODE (HCC): ICD-10-CM

## 2021-11-30 PROCEDURE — 3078F DIAST BP <80 MM HG: CPT | Performed by: INTERNAL MEDICINE

## 2021-11-30 PROCEDURE — 3008F BODY MASS INDEX DOCD: CPT | Performed by: INTERNAL MEDICINE

## 2021-11-30 PROCEDURE — 3074F SYST BP LT 130 MM HG: CPT | Performed by: INTERNAL MEDICINE

## 2021-11-30 PROCEDURE — 99213 OFFICE O/P EST LOW 20 MIN: CPT | Performed by: INTERNAL MEDICINE

## 2021-11-30 NOTE — PROGRESS NOTES
HISTORY OF PRESENT ILLNESS  Patient presents with:  Weight Check: down 1 lb      Gabino Villagomez is a 45year old female here for follow up in medical weight loss program.     Denies chest pain, shortness of breath, dizziness, blurred vision, headache, par GFRNAA 93 09/09/2011    GFRAA 108 09/09/2011    CA 9.4 10/08/2021    ALKPHO 83 10/08/2021    AST 16 10/08/2021    ALT 14 10/08/2021    BILT 0.28 10/08/2021    TP 6.9 10/08/2021    ALB 4.5 10/08/2021    GLOBULIN 2.7 09/09/2011    AGRATIO 1.7 03/21/2015    N wellbutrin xl 150 mg q day   · -advised of side effects and adverse effects of this medication  · Agree with EAP evaluation and tools and rediscuss therapist based on improvement if needed  · Discussed home workout options / as well strength training  · Re

## 2021-12-02 ENCOUNTER — TELEPHONE (OUTPATIENT)
Dept: INTERNAL MEDICINE CLINIC | Facility: CLINIC | Age: 51
End: 2021-12-02

## 2021-12-02 NOTE — TELEPHONE ENCOUNTER
Patient asking for coverage exception to be done on [address] is on her card  [phone]  [id number]

## 2021-12-02 NOTE — TELEPHONE ENCOUNTER
Verified with patient she wants wegovy 1mg/2ml  I called [employer] and spoke with ivy - she is faxing form to complete.   ID [id number]

## 2021-12-06 NOTE — TELEPHONE ENCOUNTER
Note from [employer] to give additional information about what has tried in Past  Phentermine 15 mg [id number]  Phentermine 30 mg 5/8/20 to 7/3/20  Phentermine 37.5 mg 9/3/20 to 6/15/21    saxenda 3 mg 8/18/21  Need to fax note with copy of meds tried (print the

## 2021-12-17 ENCOUNTER — IMMUNIZATION (OUTPATIENT)
Dept: LAB | Facility: HOSPITAL | Age: 51
End: 2021-12-17
Attending: EMERGENCY MEDICINE
Payer: COMMERCIAL

## 2021-12-17 DIAGNOSIS — Z23 NEED FOR VACCINATION: Primary | ICD-10-CM

## 2021-12-17 PROCEDURE — 0004A SARSCOV2 VAC 30MCG/0.3ML IM: CPT

## 2021-12-30 RX ORDER — ALPRAZOLAM 0.25 MG/1
TABLET ORAL
Qty: 60 TABLET | Refills: 0 | Status: SHIPPED | OUTPATIENT
Start: 2021-12-30 | End: 2022-01-25 | Stop reason: ALTCHOICE

## 2021-12-30 RX ORDER — SEMAGLUTIDE 1.7 MG/.75ML
1.7 INJECTION, SOLUTION SUBCUTANEOUS WEEKLY
Qty: 3 ML | Refills: 0 | Status: SHIPPED | OUTPATIENT
Start: 2021-12-30 | End: 2022-01-21

## 2022-01-17 RX ORDER — BUPROPION HYDROCHLORIDE 150 MG/1
TABLET ORAL
Qty: 30 TABLET | Refills: 1 | Status: SHIPPED | OUTPATIENT
Start: 2022-01-17 | End: 2022-01-25 | Stop reason: ALTCHOICE

## 2022-01-17 NOTE — TELEPHONE ENCOUNTER
Requesting   Requested Prescriptions     Pending Prescriptions Disp Refills   • BUPROPION 150 MG Oral Tablet 24 Hr [Pharmacy Med Name: BUPROPION HCL  MG TABLET] 30 tablet 1     Sig: TAKE 1 TABLET BY MOUTH EVERY DAY     LOV: 11/30/21  RTC: 6 weeks  Fi

## 2022-01-26 ENCOUNTER — OFFICE VISIT (OUTPATIENT)
Dept: INTERNAL MEDICINE CLINIC | Facility: CLINIC | Age: 52
End: 2022-01-26
Payer: COMMERCIAL

## 2022-01-26 VITALS
HEIGHT: 62 IN | WEIGHT: 145 LBS | DIASTOLIC BLOOD PRESSURE: 60 MMHG | SYSTOLIC BLOOD PRESSURE: 122 MMHG | BODY MASS INDEX: 26.68 KG/M2 | HEART RATE: 78 BPM | RESPIRATION RATE: 16 BRPM

## 2022-01-26 DIAGNOSIS — Z51.81 THERAPEUTIC DRUG MONITORING: Primary | ICD-10-CM

## 2022-01-26 DIAGNOSIS — E66.9 OBESITY (BMI 30-39.9): ICD-10-CM

## 2022-01-26 PROCEDURE — 3074F SYST BP LT 130 MM HG: CPT | Performed by: INTERNAL MEDICINE

## 2022-01-26 PROCEDURE — 99213 OFFICE O/P EST LOW 20 MIN: CPT | Performed by: INTERNAL MEDICINE

## 2022-01-26 PROCEDURE — 3008F BODY MASS INDEX DOCD: CPT | Performed by: INTERNAL MEDICINE

## 2022-01-26 PROCEDURE — 3078F DIAST BP <80 MM HG: CPT | Performed by: INTERNAL MEDICINE

## 2022-01-26 RX ORDER — SEMAGLUTIDE 2.4 MG/.75ML
2.4 INJECTION, SOLUTION SUBCUTANEOUS WEEKLY
Qty: 3 ML | Refills: 0 | Status: SHIPPED | OUTPATIENT
Start: 2022-01-26 | End: 2022-02-17

## 2022-01-26 RX ORDER — SEMAGLUTIDE 1.7 MG/.75ML
1.7 INJECTION, SOLUTION SUBCUTANEOUS WEEKLY
Qty: 3 ML | Refills: 0 | Status: SHIPPED | OUTPATIENT
Start: 2022-01-26 | End: 2022-02-17

## 2022-01-26 RX ORDER — PHENTERMINE HYDROCHLORIDE 37.5 MG/1
37.5 TABLET ORAL
Qty: 30 TABLET | Refills: 1 | Status: SHIPPED | OUTPATIENT
Start: 2022-01-26

## 2022-01-26 NOTE — PROGRESS NOTES
HISTORY OF PRESENT ILLNESS  Patient presents with:  Weight Check: up 1 lb      Vanderbilt Alpers is a 45year old female here for follow up in medical weight loss program.     Denies chest pain, shortness of breath, dizziness, blurred vision, headache, pares ALT 14 10/08/2021    BILT 0.28 10/08/2021    TP 6.9 10/08/2021    ALB 4.5 10/08/2021    GLOBULIN 2.7 09/09/2011    AGRATIO 1.7 03/21/2015     10/08/2021    K 4.64 10/08/2021     10/08/2021    CO2 26.3 10/08/2021     No results found for: EAG, A training  · Reviewed methods to incorporate exercise  · Reviewed overall patient doing amazing even with high stress and outside factors.    · Start working on more protein, fiber, fruits, vegetables  · Nutrition: low carb diet/ recommended to eat breakfast

## 2022-03-10 NOTE — TELEPHONE ENCOUNTER
Requesting Wegovy 1.7 mg  LOV: 1/26/22  RTC: not noted  Last Relevant Labs: na  Filled: 1/26/22 #3ml with 0 refills    Future Appointments   Date Time Provider Shemar Vann   3/24/2022  1:00 PM Vira Duke MD [facility]

## 2022-03-17 RX ORDER — SEMAGLUTIDE 1.7 MG/.75ML
1.7 INJECTION, SOLUTION SUBCUTANEOUS WEEKLY
Refills: 0 | OUTPATIENT
Start: 2022-03-17 | End: 2022-04-08

## 2022-03-18 ENCOUNTER — HOSPITAL ENCOUNTER (OUTPATIENT)
Dept: MAMMOGRAPHY | Age: 52
Discharge: HOME | End: 2022-03-18
Attending: OBSTETRICS & GYNECOLOGY
Payer: COMMERCIAL

## 2022-03-18 DIAGNOSIS — Z12.31 ENCOUNTER FOR SCREENING MAMMOGRAM FOR MALIGNANT NEOPLASM OF BREAST: ICD-10-CM

## 2022-03-18 PROCEDURE — 77063 BREAST TOMOSYNTHESIS BI: CPT | Performed by: OBSTETRICS & GYNECOLOGY

## 2022-03-18 PROCEDURE — 77067 SCR MAMMO BI INCL CAD: CPT | Performed by: OBSTETRICS & GYNECOLOGY

## 2022-03-24 ENCOUNTER — OFFICE VISIT (OUTPATIENT)
Dept: INTERNAL MEDICINE CLINIC | Facility: CLINIC | Age: 52
End: 2022-03-24
Payer: COMMERCIAL

## 2022-03-24 VITALS
RESPIRATION RATE: 16 BRPM | HEIGHT: 62 IN | WEIGHT: 134 LBS | DIASTOLIC BLOOD PRESSURE: 78 MMHG | SYSTOLIC BLOOD PRESSURE: 124 MMHG | BODY MASS INDEX: 24.66 KG/M2 | HEART RATE: 78 BPM

## 2022-03-24 DIAGNOSIS — Z51.81 THERAPEUTIC DRUG MONITORING: Primary | ICD-10-CM

## 2022-03-24 DIAGNOSIS — E66.9 OBESITY (BMI 30-39.9): ICD-10-CM

## 2022-03-24 PROCEDURE — 3078F DIAST BP <80 MM HG: CPT | Performed by: INTERNAL MEDICINE

## 2022-03-24 PROCEDURE — 3008F BODY MASS INDEX DOCD: CPT | Performed by: INTERNAL MEDICINE

## 2022-03-24 PROCEDURE — 99213 OFFICE O/P EST LOW 20 MIN: CPT | Performed by: INTERNAL MEDICINE

## 2022-03-24 PROCEDURE — 3074F SYST BP LT 130 MM HG: CPT | Performed by: INTERNAL MEDICINE

## 2022-03-24 RX ORDER — SEMAGLUTIDE 2.4 MG/.75ML
2.4 INJECTION, SOLUTION SUBCUTANEOUS WEEKLY
Qty: 3 ML | Refills: 2 | Status: SHIPPED | OUTPATIENT
Start: 2022-03-24 | End: 2022-04-15

## 2022-03-24 RX ORDER — SEMAGLUTIDE 2.4 MG/.75ML
2.4 INJECTION, SOLUTION SUBCUTANEOUS
COMMUNITY
Start: 2022-03-14

## 2022-03-24 RX ORDER — PHENTERMINE HYDROCHLORIDE 37.5 MG/1
37.5 TABLET ORAL
Qty: 30 TABLET | Refills: 1 | Status: SHIPPED | OUTPATIENT
Start: 2022-03-24

## 2022-04-28 ENCOUNTER — OFFICE VISIT (OUTPATIENT)
Dept: FAMILY MEDICINE CLINIC | Facility: CLINIC | Age: 52
End: 2022-04-28
Payer: COMMERCIAL

## 2022-04-28 VITALS
RESPIRATION RATE: 16 BRPM | DIASTOLIC BLOOD PRESSURE: 62 MMHG | TEMPERATURE: 99 F | SYSTOLIC BLOOD PRESSURE: 100 MMHG | HEART RATE: 60 BPM

## 2022-04-28 DIAGNOSIS — H10.33 ACUTE BACTERIAL CONJUNCTIVITIS OF BOTH EYES: Primary | ICD-10-CM

## 2022-04-28 PROCEDURE — 3078F DIAST BP <80 MM HG: CPT | Performed by: NURSE PRACTITIONER

## 2022-04-28 PROCEDURE — 99213 OFFICE O/P EST LOW 20 MIN: CPT | Performed by: NURSE PRACTITIONER

## 2022-04-28 PROCEDURE — 3074F SYST BP LT 130 MM HG: CPT | Performed by: NURSE PRACTITIONER

## 2022-04-28 RX ORDER — TOBRAMYCIN 3 MG/ML
SOLUTION/ DROPS OPHTHALMIC
Qty: 10 ML | Refills: 0 | Status: SHIPPED | OUTPATIENT
Start: 2022-04-28

## 2022-05-03 ENCOUNTER — OFFICE VISIT (OUTPATIENT)
Dept: FAMILY MEDICINE CLINIC | Facility: CLINIC | Age: 52
End: 2022-05-03
Payer: COMMERCIAL

## 2022-05-03 VITALS
SYSTOLIC BLOOD PRESSURE: 113 MMHG | TEMPERATURE: 98 F | OXYGEN SATURATION: 97 % | HEART RATE: 81 BPM | WEIGHT: 129.19 LBS | DIASTOLIC BLOOD PRESSURE: 65 MMHG | BODY MASS INDEX: 24 KG/M2 | RESPIRATION RATE: 18 BRPM

## 2022-05-03 DIAGNOSIS — J02.9 ACUTE PHARYNGITIS, UNSPECIFIED ETIOLOGY: Primary | ICD-10-CM

## 2022-05-03 LAB
CONTROL LINE PRESENT WITH A CLEAR BACKGROUND (YES/NO): YES YES/NO
KIT LOT #: NORMAL NUMERIC
STREP GRP A CUL-SCR: NEGATIVE

## 2022-05-03 PROCEDURE — 87081 CULTURE SCREEN ONLY: CPT | Performed by: NURSE PRACTITIONER

## 2022-05-03 PROCEDURE — 99213 OFFICE O/P EST LOW 20 MIN: CPT | Performed by: NURSE PRACTITIONER

## 2022-05-03 PROCEDURE — 87880 STREP A ASSAY W/OPTIC: CPT | Performed by: NURSE PRACTITIONER

## 2022-05-03 PROCEDURE — 3074F SYST BP LT 130 MM HG: CPT | Performed by: NURSE PRACTITIONER

## 2022-05-03 PROCEDURE — 3078F DIAST BP <80 MM HG: CPT | Performed by: NURSE PRACTITIONER

## 2022-05-03 RX ORDER — UBROGEPANT 100 MG/1
100 TABLET ORAL
COMMUNITY

## 2022-05-31 ENCOUNTER — OFFICE VISIT (OUTPATIENT)
Dept: FAMILY MEDICINE CLINIC | Facility: CLINIC | Age: 52
End: 2022-05-31
Payer: COMMERCIAL

## 2022-05-31 VITALS
RESPIRATION RATE: 18 BRPM | SYSTOLIC BLOOD PRESSURE: 120 MMHG | HEART RATE: 111 BPM | HEIGHT: 62 IN | BODY MASS INDEX: 23.07 KG/M2 | DIASTOLIC BLOOD PRESSURE: 80 MMHG | WEIGHT: 125.38 LBS | TEMPERATURE: 98 F | OXYGEN SATURATION: 98 %

## 2022-05-31 DIAGNOSIS — J02.9 SORE THROAT: Primary | ICD-10-CM

## 2022-05-31 LAB
CONTROL LINE PRESENT WITH A CLEAR BACKGROUND (YES/NO): YES YES/NO
STREP GRP A CUL-SCR: NEGATIVE

## 2022-05-31 PROCEDURE — 87880 STREP A ASSAY W/OPTIC: CPT | Performed by: NURSE PRACTITIONER

## 2022-05-31 PROCEDURE — 87081 CULTURE SCREEN ONLY: CPT | Performed by: NURSE PRACTITIONER

## 2022-05-31 PROCEDURE — 99213 OFFICE O/P EST LOW 20 MIN: CPT | Performed by: NURSE PRACTITIONER

## 2022-05-31 PROCEDURE — 3008F BODY MASS INDEX DOCD: CPT | Performed by: NURSE PRACTITIONER

## 2022-05-31 PROCEDURE — 3079F DIAST BP 80-89 MM HG: CPT | Performed by: NURSE PRACTITIONER

## 2022-05-31 PROCEDURE — 3074F SYST BP LT 130 MM HG: CPT | Performed by: NURSE PRACTITIONER

## 2022-05-31 RX ORDER — LIDOCAINE HYDROCHLORIDE 20 MG/ML
SOLUTION OROPHARYNGEAL
Qty: 100 ML | Refills: 0 | Status: SHIPPED | OUTPATIENT
Start: 2022-05-31

## 2022-06-01 LAB — SARS-COV-2 RNA RESP QL NAA+PROBE: NOT DETECTED

## 2022-06-23 ENCOUNTER — OFFICE VISIT (OUTPATIENT)
Dept: INTERNAL MEDICINE CLINIC | Facility: CLINIC | Age: 52
End: 2022-06-23
Payer: COMMERCIAL

## 2022-06-23 VITALS
RESPIRATION RATE: 16 BRPM | DIASTOLIC BLOOD PRESSURE: 60 MMHG | WEIGHT: 124 LBS | SYSTOLIC BLOOD PRESSURE: 110 MMHG | BODY MASS INDEX: 22.82 KG/M2 | HEIGHT: 62 IN | HEART RATE: 68 BPM

## 2022-06-23 DIAGNOSIS — Z51.81 THERAPEUTIC DRUG MONITORING: Primary | ICD-10-CM

## 2022-06-23 DIAGNOSIS — N95.1 PERIMENOPAUSAL: ICD-10-CM

## 2022-06-23 DIAGNOSIS — E66.9 OBESITY (BMI 30-39.9): ICD-10-CM

## 2022-06-23 PROCEDURE — 3078F DIAST BP <80 MM HG: CPT | Performed by: INTERNAL MEDICINE

## 2022-06-23 PROCEDURE — 3008F BODY MASS INDEX DOCD: CPT | Performed by: INTERNAL MEDICINE

## 2022-06-23 PROCEDURE — 99213 OFFICE O/P EST LOW 20 MIN: CPT | Performed by: INTERNAL MEDICINE

## 2022-06-23 PROCEDURE — 3074F SYST BP LT 130 MM HG: CPT | Performed by: INTERNAL MEDICINE

## 2022-06-23 RX ORDER — SEMAGLUTIDE 2.4 MG/.75ML
2.4 INJECTION, SOLUTION SUBCUTANEOUS WEEKLY
Qty: 9 ML | Refills: 0 | Status: SHIPPED | OUTPATIENT
Start: 2022-06-23 | End: 2022-09-09

## 2022-10-05 ENCOUNTER — TELEMEDICINE (OUTPATIENT)
Dept: INTERNAL MEDICINE CLINIC | Facility: CLINIC | Age: 52
End: 2022-10-05

## 2022-10-05 DIAGNOSIS — Z51.81 THERAPEUTIC DRUG MONITORING: Primary | ICD-10-CM

## 2022-10-05 DIAGNOSIS — E66.9 OBESITY (BMI 30-39.9): ICD-10-CM

## 2022-10-05 PROCEDURE — 99213 OFFICE O/P EST LOW 20 MIN: CPT | Performed by: INTERNAL MEDICINE

## 2022-10-07 ENCOUNTER — HOSPITAL ENCOUNTER (OUTPATIENT)
Dept: MAMMOGRAPHY | Age: 52
Discharge: HOME | End: 2022-10-07
Attending: OBSTETRICS & GYNECOLOGY
Payer: COMMERCIAL

## 2022-10-07 DIAGNOSIS — R92.8 ABNORMAL MAMMOGRAM: ICD-10-CM

## 2022-10-07 PROCEDURE — 77066 DX MAMMO INCL CAD BI: CPT | Performed by: OBSTETRICS & GYNECOLOGY

## 2022-10-07 PROCEDURE — 77062 BREAST TOMOSYNTHESIS BI: CPT | Performed by: OBSTETRICS & GYNECOLOGY

## 2022-10-09 NOTE — TELEPHONE ENCOUNTER
Requesting wegovy 2.4 mg  LOV: 10/5/22  RTC: not noted  Last Relevant Labs: na  Filled: 6/23/22 #9ml with 0 refills    1/17/2023  8:20 AM Teresita Lovett MD [facility]

## 2022-10-10 ENCOUNTER — IMMUNIZATION (OUTPATIENT)
Dept: LAB | Facility: HOSPITAL | Age: 52
End: 2022-10-10
Attending: PREVENTIVE MEDICINE
Payer: COMMERCIAL

## 2022-10-10 DIAGNOSIS — Z23 NEED FOR VACCINATION: Primary | ICD-10-CM

## 2022-10-10 PROCEDURE — 90471 IMMUNIZATION ADMIN: CPT

## 2022-10-10 RX ORDER — SEMAGLUTIDE 2.4 MG/.75ML
INJECTION, SOLUTION SUBCUTANEOUS
Qty: 9 ML | Refills: 0 | Status: SHIPPED | OUTPATIENT
Start: 2022-10-10

## 2022-10-11 RX ORDER — SEMAGLUTIDE 2.4 MG/.75ML
2.4 INJECTION, SOLUTION SUBCUTANEOUS WEEKLY
Qty: 4 EACH | Refills: 2 | Status: SHIPPED | OUTPATIENT
Start: 2022-10-11

## 2022-10-11 RX ORDER — PHENTERMINE HYDROCHLORIDE 37.5 MG/1
37.5 TABLET ORAL
Qty: 30 TABLET | Refills: 2 | Status: SHIPPED | OUTPATIENT
Start: 2022-10-11

## 2022-12-02 ENCOUNTER — LAB ENCOUNTER (OUTPATIENT)
Dept: LAB | Facility: HOSPITAL | Age: 52
End: 2022-12-02
Attending: PREVENTIVE MEDICINE
Payer: COMMERCIAL

## 2022-12-02 ENCOUNTER — TELEPHONE (OUTPATIENT)
Dept: INTERNAL MEDICINE CLINIC | Facility: HOSPITAL | Age: 52
End: 2022-12-02

## 2022-12-02 DIAGNOSIS — Z20.822 SUSPECTED 2019 NOVEL CORONAVIRUS INFECTION: ICD-10-CM

## 2022-12-02 DIAGNOSIS — Z20.822 SUSPECTED 2019 NOVEL CORONAVIRUS INFECTION: Primary | ICD-10-CM

## 2022-12-02 LAB — SARS-COV-2 RNA RESP QL NAA+PROBE: DETECTED

## 2022-12-03 NOTE — TELEPHONE ENCOUNTER
Results and RTW guidelines:    COVID RESULT:    [x] Viewed by employee in [address] RTW plan and instructions as indicated on triage call. Manager notified. Estimated RTW date: 12/5  [] Discussed with employee   [x] Unable to reach by phone. Sent via [place] message      Test type:    [x] Rapid         [] Alinity         [] Outside test:     [x] Positive     - Employee should quarantine at home for at least 5 days (day 1 is day after sx onset) , follow the CDC guidelines for cleaning and                              quarantining; see CDC.gov   -This employee may RTW on day 6 if asymptomatic or mildly symptomatic (with improving symptoms). Call Employee Health on day 5 if unable to return on day 6 after                      symptom onset.    -This employee needs to call Employee Health on day 5 after symptom onset. The employee needs to be cleared by Employee Health. - Monitor symptoms and temperature                 - Notify PCP of result                 - Seek emergent care with worsening symptoms   - If employee is still experiencing severe symptoms on day 5 must make a RTW appt with Employee [facility], Employee will not be cleared if:    1. Has consistent cough, shortness of breath or fatigue that restricts your physical activities    2. Is still feeling \"unwell\"    3. Within 15 days of hospitalization for COVID    4. Within 20 days of intubation for COVID    5.  Still has a fever, vomiting or diarrhea   - Keep communication open with management about RTW and if symptoms worsen                - If outside testing completed, bring a copy of result to RTW appointment           Notes:     RTW PLAN:    [x]  If COVID positive results, off work minimum of 5 days from positive test or onset of symptoms (day 0)        On day 5, if asymptomatic or mildly symptomatic (with improving symptoms) may return to work day 6          On day 5, if symptomatic, call Employee Health for RTW screening        []  COVID positive result - call Employee Health on day 5 after symptom onset. The employee needs to be cleared by Employee Health to RTW. [] RTW immediately, continue to monitor for sx  [] RTW when sx improve; must be fever free for 24 hours w/o medications, Diarrhea/Vomiting free for 24 hours w/o medications  [] Alinity ordered; continue to monitor sx and call for new/worsening sx.   Discuss RTW guidelines with manager  [] May continue to work  [] Follow up with PCP  [] Home until further instruction from hotline with Alinity results  INSTRUCTIONS PROVIDED:  [x]  Plan as noted above  []  Length of time to obtain results   [x]  Quarantine instructions  [x]  Masking protocol   [x]  S/S of worsening infection/condition and importance of prompt medical re-evaluation including when to seek emergency care  [] If symptoms develop, stay home and call hotline for rapid test order    Estimated RTW date:  12/5    [] The employee voiced understanding of above plan/instructions  [x] Manager Notified

## 2023-02-18 ENCOUNTER — APPOINTMENT (OUTPATIENT)
Dept: GENERAL RADIOLOGY | Age: 53
End: 2023-02-18
Attending: EMERGENCY MEDICINE
Payer: COMMERCIAL

## 2023-02-18 ENCOUNTER — HOSPITAL ENCOUNTER (EMERGENCY)
Facility: HOSPITAL | Age: 53
Discharge: HOME | End: 2023-02-18
Attending: EMERGENCY MEDICINE
Payer: COMMERCIAL

## 2023-02-18 ENCOUNTER — APPOINTMENT (OUTPATIENT)
Dept: ULTRASOUND IMAGING | Facility: HOSPITAL | Age: 53
End: 2023-02-18
Attending: EMERGENCY MEDICINE
Payer: COMMERCIAL

## 2023-02-18 ENCOUNTER — HOSPITAL ENCOUNTER (OUTPATIENT)
Age: 53
Discharge: ANOTHER HOSPITAL | End: 2023-02-18
Attending: EMERGENCY MEDICINE
Payer: COMMERCIAL

## 2023-02-18 VITALS
WEIGHT: 118 LBS | RESPIRATION RATE: 18 BRPM | HEART RATE: 120 BPM | DIASTOLIC BLOOD PRESSURE: 85 MMHG | OXYGEN SATURATION: 98 % | SYSTOLIC BLOOD PRESSURE: 111 MMHG | HEIGHT: 62 IN | TEMPERATURE: 97 F | BODY MASS INDEX: 21.71 KG/M2

## 2023-02-18 VITALS
SYSTOLIC BLOOD PRESSURE: 102 MMHG | DIASTOLIC BLOOD PRESSURE: 69 MMHG | RESPIRATION RATE: 22 BRPM | WEIGHT: 118 LBS | BODY MASS INDEX: 21.71 KG/M2 | HEIGHT: 62 IN | OXYGEN SATURATION: 100 % | HEART RATE: 89 BPM

## 2023-02-18 DIAGNOSIS — I24.9 ACS (ACUTE CORONARY SYNDROME) (HCC): Primary | ICD-10-CM

## 2023-02-18 DIAGNOSIS — K21.9 GASTROESOPHAGEAL REFLUX DISEASE, UNSPECIFIED WHETHER ESOPHAGITIS PRESENT: Primary | ICD-10-CM

## 2023-02-18 LAB
#MXD IC: 0.5 X10ˆ3/UL (ref 0.1–1)
ALBUMIN SERPL-MCNC: 3.3 G/DL (ref 3.4–5)
ALP LIVER SERPL-CCNC: 69 U/L
ALT SERPL-CCNC: 20 U/L
AST SERPL-CCNC: 16 U/L (ref 15–37)
ATRIAL RATE: 82 BPM
ATRIAL RATE: 91 BPM
BILIRUB DIRECT SERPL-MCNC: 0.2 MG/DL (ref 0–0.2)
BILIRUB SERPL-MCNC: 0.7 MG/DL (ref 0.1–2)
BUN BLD-MCNC: 23 MG/DL (ref 7–18)
CHLORIDE BLD-SCNC: 103 MMOL/L (ref 98–112)
CO2 BLD-SCNC: 24 MMOL/L (ref 21–32)
CREAT BLD-MCNC: 0.9 MG/DL
DDIMER WHOLE BLOOD: <200 NG/ML DDU (ref ?–400)
GFR SERPLBLD BASED ON 1.73 SQ M-ARVRAT: 76 ML/MIN/1.73M2 (ref 60–?)
GLUCOSE BLD-MCNC: 96 MG/DL (ref 70–99)
HCT VFR BLD AUTO: 42.5 %
HCT VFR BLD CALC: 42 %
HGB BLD-MCNC: 13.3 G/DL
ISTAT IONIZED CALCIUM FOR CHEM 8: 1.17 MMOL/L (ref 1.12–1.32)
LIPASE SERPL-CCNC: 22 U/L (ref 13–75)
LIPASE SERPL-CCNC: 83 U/L (ref 73–393)
LYMPHOCYTES # BLD AUTO: 1.2 X10ˆ3/UL (ref 1–4)
LYMPHOCYTES NFR BLD AUTO: 22.8 %
MCH RBC QN AUTO: 28.5 PG (ref 26–34)
MCHC RBC AUTO-ENTMCNC: 31.3 G/DL (ref 31–37)
MCV RBC AUTO: 91.2 FL (ref 80–100)
MIXED CELL %: 10.3 %
NEUTROPHILS # BLD AUTO: 3.6 X10ˆ3/UL (ref 1.5–7.7)
NEUTROPHILS NFR BLD AUTO: 66.9 %
P AXIS: 62 DEGREES
P AXIS: 76 DEGREES
P-R INTERVAL: 142 MS
P-R INTERVAL: 148 MS
PLATELET # BLD AUTO: 308 X10ˆ3/UL (ref 150–450)
POTASSIUM BLD-SCNC: 4.4 MMOL/L (ref 3.6–5.1)
PROT SERPL-MCNC: 6.3 G/DL (ref 6.4–8.2)
Q-T INTERVAL: 360 MS
Q-T INTERVAL: 364 MS
QRS DURATION: 68 MS
QRS DURATION: 72 MS
QTC CALCULATION (BEZET): 425 MS
QTC CALCULATION (BEZET): 442 MS
R AXIS: -4 DEGREES
R AXIS: 5 DEGREES
RBC # BLD AUTO: 4.66 X10ˆ6/UL
SODIUM BLD-SCNC: 138 MMOL/L (ref 136–145)
T AXIS: 40 DEGREES
T AXIS: 64 DEGREES
TROPONIN I BLD-MCNC: 0.26 NG/ML
TROPONIN I HIGH SENSITIVITY: <3 NG/L
TROPONIN I HIGH SENSITIVITY: <3 NG/L
VENTRICULAR RATE: 82 BPM
VENTRICULAR RATE: 91 BPM
WBC # BLD AUTO: 5.3 X10ˆ3/UL (ref 4–11)

## 2023-02-18 PROCEDURE — 93005 ELECTROCARDIOGRAM TRACING: CPT

## 2023-02-18 PROCEDURE — 36415 COLL VENOUS BLD VENIPUNCTURE: CPT

## 2023-02-18 PROCEDURE — 76700 US EXAM ABDOM COMPLETE: CPT | Performed by: EMERGENCY MEDICINE

## 2023-02-18 PROCEDURE — 93010 ELECTROCARDIOGRAM REPORT: CPT

## 2023-02-18 PROCEDURE — 99285 EMERGENCY DEPT VISIT HI MDM: CPT

## 2023-02-18 PROCEDURE — 80047 BASIC METABLC PNL IONIZED CA: CPT

## 2023-02-18 PROCEDURE — 85378 FIBRIN DEGRADE SEMIQUANT: CPT | Performed by: EMERGENCY MEDICINE

## 2023-02-18 PROCEDURE — 99215 OFFICE O/P EST HI 40 MIN: CPT

## 2023-02-18 PROCEDURE — 84484 ASSAY OF TROPONIN QUANT: CPT

## 2023-02-18 PROCEDURE — 83690 ASSAY OF LIPASE: CPT | Performed by: EMERGENCY MEDICINE

## 2023-02-18 PROCEDURE — 85025 COMPLETE CBC W/AUTO DIFF WBC: CPT | Performed by: EMERGENCY MEDICINE

## 2023-02-18 PROCEDURE — 84484 ASSAY OF TROPONIN QUANT: CPT | Performed by: EMERGENCY MEDICINE

## 2023-02-18 PROCEDURE — 80076 HEPATIC FUNCTION PANEL: CPT | Performed by: EMERGENCY MEDICINE

## 2023-02-18 PROCEDURE — 71046 X-RAY EXAM CHEST 2 VIEWS: CPT | Performed by: EMERGENCY MEDICINE

## 2023-02-18 RX ORDER — ASPIRIN 81 MG/1
324 TABLET, CHEWABLE ORAL ONCE
Status: COMPLETED | OUTPATIENT
Start: 2023-02-18 | End: 2023-02-18

## 2023-02-18 RX ORDER — NICOTINE POLACRILEX 4 MG/1
20 GUM, CHEWING ORAL DAILY
Qty: 30 TABLET | Refills: 0 | Status: SHIPPED | OUTPATIENT
Start: 2023-02-18 | End: 2023-03-20

## 2023-02-18 NOTE — ED QUICK NOTES
Per Dr. Valentine Peres, pt need to be transferred to ED for elevated labs. 911 contacted. Pt transferred out of IC via stretcher with EMS.

## 2023-02-18 NOTE — ED INITIAL ASSESSMENT (HPI)
C/o intermittent heaviness/burning in center of chest that lasts for 30 minutes-1 hr at a time x 6 weeks. Mom recently passed away. Yesterday, she felt nauseous, vomited and had a rash across her chest. Denies sob.

## 2023-02-18 NOTE — ED INITIAL ASSESSMENT (HPI)
Patient with chest pain on and off since mother passed away on Dec 30th. Comes from [facility]. Received ASA 324mg pta.

## 2023-02-18 NOTE — DISCHARGE INSTRUCTIONS
Take antacids such as Maalox Mylanta or Tums up to 4 times a day. Follow-up with your primary care physician over the next few days return for worsening symptoms.

## 2023-03-16 ENCOUNTER — OFFICE VISIT (OUTPATIENT)
Dept: INTERNAL MEDICINE CLINIC | Facility: CLINIC | Age: 53
End: 2023-03-16
Payer: COMMERCIAL

## 2023-03-16 VITALS
WEIGHT: 125 LBS | RESPIRATION RATE: 16 BRPM | BODY MASS INDEX: 23 KG/M2 | SYSTOLIC BLOOD PRESSURE: 110 MMHG | HEART RATE: 68 BPM | HEIGHT: 62 IN | DIASTOLIC BLOOD PRESSURE: 62 MMHG

## 2023-03-16 DIAGNOSIS — E66.9 OBESITY (BMI 30-39.9): ICD-10-CM

## 2023-03-16 DIAGNOSIS — Z51.81 THERAPEUTIC DRUG MONITORING: Primary | ICD-10-CM

## 2023-03-16 PROCEDURE — 3008F BODY MASS INDEX DOCD: CPT | Performed by: INTERNAL MEDICINE

## 2023-03-16 PROCEDURE — 99213 OFFICE O/P EST LOW 20 MIN: CPT | Performed by: INTERNAL MEDICINE

## 2023-03-16 PROCEDURE — 3074F SYST BP LT 130 MM HG: CPT | Performed by: INTERNAL MEDICINE

## 2023-03-16 PROCEDURE — 3078F DIAST BP <80 MM HG: CPT | Performed by: INTERNAL MEDICINE

## 2023-03-16 RX ORDER — PHENTERMINE HYDROCHLORIDE 37.5 MG/1
37.5 TABLET ORAL
Qty: 30 TABLET | Refills: 2 | Status: SHIPPED | OUTPATIENT
Start: 2023-03-16

## 2023-03-16 RX ORDER — SEMAGLUTIDE 2.4 MG/.75ML
2.4 INJECTION, SOLUTION SUBCUTANEOUS WEEKLY
Qty: 9 ML | Refills: 0 | Status: SHIPPED | OUTPATIENT
Start: 2023-03-16

## 2023-04-29 ENCOUNTER — HOSPITAL ENCOUNTER (OUTPATIENT)
Dept: CT IMAGING | Age: 53
Discharge: HOME | End: 2023-04-29
Attending: Other
Payer: COMMERCIAL

## 2023-04-29 DIAGNOSIS — R51.9 NOCTURNAL HEADACHES: ICD-10-CM

## 2023-04-29 PROCEDURE — 70450 CT HEAD/BRAIN W/O DYE: CPT | Performed by: OTHER

## 2023-07-11 ENCOUNTER — OFFICE VISIT (OUTPATIENT)
Dept: INTERNAL MEDICINE CLINIC | Facility: CLINIC | Age: 53
End: 2023-07-11
Payer: COMMERCIAL

## 2023-07-11 VITALS
BODY MASS INDEX: 23.19 KG/M2 | DIASTOLIC BLOOD PRESSURE: 68 MMHG | HEIGHT: 62 IN | OXYGEN SATURATION: 94 % | HEART RATE: 74 BPM | SYSTOLIC BLOOD PRESSURE: 110 MMHG | WEIGHT: 126 LBS | RESPIRATION RATE: 16 BRPM

## 2023-07-11 DIAGNOSIS — E66.9 OBESITY (BMI 30-39.9): ICD-10-CM

## 2023-07-11 DIAGNOSIS — Z51.81 THERAPEUTIC DRUG MONITORING: Primary | ICD-10-CM

## 2023-07-11 PROCEDURE — 3078F DIAST BP <80 MM HG: CPT | Performed by: INTERNAL MEDICINE

## 2023-07-11 PROCEDURE — 99213 OFFICE O/P EST LOW 20 MIN: CPT | Performed by: INTERNAL MEDICINE

## 2023-07-11 PROCEDURE — 3074F SYST BP LT 130 MM HG: CPT | Performed by: INTERNAL MEDICINE

## 2023-07-11 PROCEDURE — 3008F BODY MASS INDEX DOCD: CPT | Performed by: INTERNAL MEDICINE

## 2023-07-11 RX ORDER — PHENTERMINE HYDROCHLORIDE 37.5 MG/1
37.5 TABLET ORAL
Qty: 30 TABLET | Refills: 2 | Status: SHIPPED | OUTPATIENT
Start: 2023-07-11

## 2023-07-11 RX ORDER — SEMAGLUTIDE 2.4 MG/.75ML
2.4 INJECTION, SOLUTION SUBCUTANEOUS WEEKLY
Qty: 9 ML | Refills: 0 | Status: SHIPPED | OUTPATIENT
Start: 2023-07-11

## 2023-08-03 ENCOUNTER — ORDER TRANSCRIPTION (OUTPATIENT)
Dept: PHYSICAL THERAPY | Facility: HOSPITAL | Age: 53
End: 2023-08-03

## 2023-08-03 DIAGNOSIS — M54.2 CERVICALGIA: Primary | ICD-10-CM

## 2023-09-08 ENCOUNTER — TELEPHONE (OUTPATIENT)
Dept: PHYSICAL THERAPY | Facility: HOSPITAL | Age: 53
End: 2023-09-08

## 2023-09-11 ENCOUNTER — OFFICE VISIT (OUTPATIENT)
Dept: PHYSICAL THERAPY | Age: 53
End: 2023-09-11
Attending: Other
Payer: COMMERCIAL

## 2023-09-11 PROCEDURE — 97161 PT EVAL LOW COMPLEX 20 MIN: CPT

## 2023-09-11 PROCEDURE — 97110 THERAPEUTIC EXERCISES: CPT

## 2023-09-13 ENCOUNTER — OFFICE VISIT (OUTPATIENT)
Dept: PHYSICAL THERAPY | Age: 53
End: 2023-09-13
Attending: Other
Payer: COMMERCIAL

## 2023-09-13 DIAGNOSIS — M54.2 CERVICALGIA: Primary | ICD-10-CM

## 2023-09-13 PROCEDURE — 97110 THERAPEUTIC EXERCISES: CPT

## 2023-09-13 PROCEDURE — 97140 MANUAL THERAPY 1/> REGIONS: CPT

## 2023-09-15 ENCOUNTER — HOSPITAL ENCOUNTER (OUTPATIENT)
Dept: BONE DENSITY | Age: 53
Discharge: HOME | End: 2023-09-15
Attending: OBSTETRICS & GYNECOLOGY
Payer: COMMERCIAL

## 2023-09-15 DIAGNOSIS — Z13.820 SCREENING FOR OSTEOPOROSIS: ICD-10-CM

## 2023-09-15 PROCEDURE — 77080 DXA BONE DENSITY AXIAL: CPT | Performed by: OBSTETRICS & GYNECOLOGY

## 2023-09-18 ENCOUNTER — APPOINTMENT (OUTPATIENT)
Dept: PHYSICAL THERAPY | Age: 53
End: 2023-09-18
Attending: Other
Payer: COMMERCIAL

## 2023-09-28 ENCOUNTER — OFFICE VISIT (OUTPATIENT)
Dept: PHYSICAL THERAPY | Age: 53
End: 2023-09-28
Attending: Other
Payer: COMMERCIAL

## 2023-09-28 PROCEDURE — 97140 MANUAL THERAPY 1/> REGIONS: CPT

## 2023-09-28 PROCEDURE — 97110 THERAPEUTIC EXERCISES: CPT

## 2023-09-28 NOTE — PROGRESS NOTES
Diagnosis:   Cervicalgia (M54.2)          Referring Provider: Alf Bahena  Date of Evaluation:    9/11/2023    Precautions:  None Next MD visit:   none scheduled  Date of Surgery: n/a   Insurance Primary/Secondary: [employer] / N/A     # Auth Visits: 10 recommended            Subjective: no new problems. States that she had 3 headaches last week--was not able to work on HEP too much because of work being really busy. No headaches this week so far but a lot of tightness in the left side of neck. Pain: 0/10 + left sided neck tightness      Objective:     Neck Disability Index Score  Score: 14 % (9/11/2023 10:59 AM)      Cervical AROM: (* denotes performed with pain)  Flexion: 45 deg  Extension: 58 deg  Sidebending: R 30 deg; L 40 deg  Rotation: R 90 deg; L 90 deg       Assessment: completed todays treatment without c/o increased pain or discomfort following treatment. Performing DNF correctly today without cues. Good tolerance for posture exercises and progressions today. Educated pt on importance of compliance with HEP for optimal outcomes. Denies pain post treatment. Goals:    (to be met in 10 visits)   Pt will have improved thoracic PA mobility to WNL to improve cervical ROM as well as promote upright posturing and decreased pain with computer work   Pt will report decreased frequency of headaches to <1x/week  Pt will improve postural strength (mid trap) to 4+/5 to promote improved upright posturing and decreased pain with working  PT to report ability to sleep without waking due to pain   Pt will be independent and compliant with comprehensive HEP to maintain progress achieved in PT     Plan: continue POC. Progress as tolerated. Date: 9/13/2023  TX#: 2/10 Date:  9/13/2023               TX#: 3/10 Date:                 TX#: 4/ Date:                 TX#: 5/ Date:    Tx#: 6/   UBE retro x5 min UBE retro x5 min      Doorway pectoral stretch 3x10 sec Doorway pectoral stretch 3x10 sec      Wall press ups x10 Wall press ups with plus x15      Back to wall: wall angels x10 Back to wall: wall angels x10      RTB scapular rows 2x10 GTB scapular rows 3x10      Self thoracic ext on FR 2x10 Self thoracic ext on FR 2x10      Supine on FR: alt OH flexion,bilat sh diag V, alt fwd reach, bilat HBH sh ER/H ADD x10 ea   Supine on FR:   DNF 10x10 sec  alt OH flexion,bilat sh diag V, alt fwd reach, bilat HBH sh ER/H ADD x10 ea      Prone on bed: thoracic PA mobilizations Gr 4  Prone on bed: thoracic PA mobilizations Gr 4       Prone rows x10  --      Supine: cervical distraction and suboccipital release x10 min Supine: cervical distraction and suboccipital release x10 min      DNF 10x 10 sec --      HEP: exercises added to HEP will be bolded in flow sheet the day they are added and remain in the flowsheet bolded and unbolded if removed from HEP    Charges: Ex 2 MT 1       Total Timed Treatment: 45 min  Total Treatment Time: 45 min

## 2023-10-02 ENCOUNTER — APPOINTMENT (OUTPATIENT)
Dept: PHYSICAL THERAPY | Age: 53
End: 2023-10-02
Attending: Other
Payer: COMMERCIAL

## 2023-10-02 ENCOUNTER — TELEPHONE (OUTPATIENT)
Dept: PHYSICAL THERAPY | Facility: HOSPITAL | Age: 53
End: 2023-10-02

## 2023-10-05 ENCOUNTER — APPOINTMENT (OUTPATIENT)
Dept: PHYSICAL THERAPY | Age: 53
End: 2023-10-05
Attending: Other
Payer: COMMERCIAL

## 2023-10-09 ENCOUNTER — OFFICE VISIT (OUTPATIENT)
Dept: PHYSICAL THERAPY | Age: 53
End: 2023-10-09
Attending: Other
Payer: COMMERCIAL

## 2023-10-09 PROCEDURE — 97140 MANUAL THERAPY 1/> REGIONS: CPT

## 2023-10-09 PROCEDURE — 97110 THERAPEUTIC EXERCISES: CPT

## 2023-10-09 NOTE — PROGRESS NOTES
Diagnosis:   Cervicalgia (M54.2)          Referring Provider: Danny Mederos  Date of Evaluation:    9/11/2023    Precautions:  None Next MD visit:   none scheduled  Date of Surgery: n/a   Insurance Primary/Secondary: [employer] / N/A     # Auth Visits: 10 recommended            Subjective: no new problems. States that she was really busy at work last week so not able to attend PT or work on [employer]. Work has been crazy with rolling out the benefits plans. States that she had 3 headaches last week. Denies headache pre-rx. Pain: 0/10 + left sided neck tightness      Objective:     Neck Disability Index Score  Score: 14 % (9/11/2023 10:59 AM)      Cervical AROM: (* denotes performed with pain)  Flexion: 45 deg  Extension: 58 deg  Sidebending: R 30 deg; L 40 deg  Rotation: R 90 deg; L 90 deg       Assessment: completed todays treatment without c/o increased pain or discomfort following treatment. Good tolerance for exercise and strengthening progressions today. Educated on importance of compliance with HEP for optimal outcomes. Pt states understanding. Required cues  for DNF--to nod instead of retraction. Pt able to perform correctly after cues. Goals:    (to be met in 10 visits)   Pt will have improved thoracic PA mobility to WNL to improve cervical ROM as well as promote upright posturing and decreased pain with computer work   Pt will report decreased frequency of headaches to <1x/week  Pt will improve postural strength (mid trap) to 4+/5 to promote improved upright posturing and decreased pain with working  PT to report ability to sleep without waking due to pain   Pt will be independent and compliant with comprehensive HEP to maintain progress achieved in PT     Plan: continue POC. Progress as tolerated. Date: 9/13/2023  TX#: 2/10 Date:  9/13/2023               TX#: 3/10 Date:  10/9/2023               TX#: 4/10 Date:                 TX#: 5/ Date:    Tx#: 6/   UBE retro x5 min UBE retro x5 min UBE retro x5 min Doorway pectoral stretch 3x10 sec Doorway pectoral stretch 3x10 sec Doorway pectoral stretch 3x10 sec     Wall press ups x10 Wall press ups with plus x15 Wall press ups with plus x15     Back to wall: wall angels x10 Back to wall: wall angels x10 Back to wall: wall angels x10     RTB scapular rows 2x10 GTB scapular rows 3x10 GTB scapular rows 3x10  TRX low rows x10  TRX high rows x10     Self thoracic ext on FR 2x10 Self thoracic ext on FR 2x10      Supine on FR: alt OH flexion,bilat sh diag V, alt fwd reach, bilat HBH sh ER/H ADD x10 ea   Supine on FR:   DNF 10x10 sec  alt OH flexion,bilat sh diag V, alt fwd reach, bilat HBH sh ER/H ADD x10 ea Supine on FR:   DNF 10x10 sec  alt OH flexion,bilat sh diag V, alt fwd reach, bilat HBH sh ER/H ADD x10 ea     Prone on bed: thoracic PA mobilizations Gr 4  Prone on bed: thoracic PA mobilizations Gr 4  Prone on bed: thoracic PA mobilizations Gr 4      Prone rows x10  --      Supine: cervical distraction and suboccipital release x10 min Supine: cervical distraction and suboccipital release x10 min Supine: cervical distraction and suboccipital release x10 min     DNF 10x 10 sec --      HEP: exercises added to HEP will be bolded in flow sheet the day they are added and remain in the flowsheet bolded and unbolded if removed from HEP    Charges: Ex 2 MT 1       Total Timed Treatment: 45 min  Total Treatment Time: 45 min

## 2023-10-12 ENCOUNTER — OFFICE VISIT (OUTPATIENT)
Dept: PHYSICAL THERAPY | Age: 53
End: 2023-10-12
Attending: Other
Payer: COMMERCIAL

## 2023-10-12 PROCEDURE — 97110 THERAPEUTIC EXERCISES: CPT

## 2023-10-12 PROCEDURE — 97140 MANUAL THERAPY 1/> REGIONS: CPT

## 2023-10-12 NOTE — PROGRESS NOTES
Diagnosis:   Cervicalgia (M54.2)          Referring Provider: Wilmer Soto  Date of Evaluation:    9/11/2023    Precautions:  None Next MD visit:   none scheduled  Date of Surgery: n/a   Insurance Primary/Secondary: [employer] / N/A     # Auth Visits: 10 recommended            Subjective: no new problems. States she has not had a headache since last visit. She is more compliant with her HEP. Still really busy with work and spending a long time in front of the computer. Denies headache pre-rx. Pain: 0/10 + left sided neck tightness      Objective:     Neck Disability Index Score  Score: 14 % (9/11/2023 10:59 AM)      Cervical AROM: (* denotes performed with pain)  Flexion: 45 deg  Extension: 58 deg  Sidebending: R 30 deg; L 40 deg  Rotation: R 90 deg; L 90 deg       Assessment: completed todays treatment without c/o increased pain or discomfort following treatment. Good tolerance for exercise and strengthening progressions today. Performing DNF correctly today and did not require cues. Posture awareness improving and less frequent HA's this week. Goals:    (to be met in 10 visits)   Pt will have improved thoracic PA mobility to WNL to improve cervical ROM as well as promote upright posturing and decreased pain with computer work   Pt will report decreased frequency of headaches to <1x/week  Pt will improve postural strength (mid trap) to 4+/5 to promote improved upright posturing and decreased pain with working  PT to report ability to sleep without waking due to pain   Pt will be independent and compliant with comprehensive HEP to maintain progress achieved in PT     Plan: continue POC. Progress as tolerated. Date: 9/13/2023  TX#: 2/10 Date:  9/13/2023               TX#: 3/10 Date:  10/9/2023               TX#: 4/10 Date: 10/12/2023                TX#: 5/10 Date:    Tx#: 6/   UBE retro x5 min UBE retro x5 min UBE retro x5 min UBE retro x5 min    Doorway pectoral stretch 3x10 sec Doorway pectoral stretch 3x10 sec Doorway pectoral stretch 3x10 sec Doorway pectoral stretch 3x10 sec    Wall press ups x10 Wall press ups with plus x15 Wall press ups with plus x15 Wall press ups on YSB with plus x15    Back to wall: wall angels x10 Back to wall: wall angels x10 Back to wall: wall angels x10 Back to wall: wall angels x10    RTB scapular rows 2x10 GTB scapular rows 3x10 GTB scapular rows 3x10  TRX low rows x10  TRX high rows x10 GTB scapular rows 3x10  TRX low rows x12  TRX high rows x12    Self thoracic ext on FR 2x10 Self thoracic ext on FR 2x10  Self thoracic ext on FR 2x10    Supine on FR: alt OH flexion,bilat sh diag V, alt fwd reach, bilat HBH sh ER/H ADD x10 ea   Supine on FR:   DNF 10x10 sec  alt OH flexion,bilat sh diag V, alt fwd reach, bilat HBH sh ER/H ADD x10 ea Supine on FR:   DNF 10x10 sec  alt OH flexion,bilat sh diag V, alt fwd reach, bilat HBH sh ER/H ADD x10 ea Supine on FR:   DNF 10x10 sec  alt OH flexion,bilat sh diag V, alt fwd reach, bilat HBH sh ER/H ADD x10 ea    Prone on bed: thoracic PA mobilizations Gr 4  Prone on bed: thoracic PA mobilizations Gr 4  Prone on bed: thoracic PA mobilizations Gr 4      Prone rows x10  --      Supine: cervical distraction and suboccipital release x10 min Supine: cervical distraction and suboccipital release x10 min Supine: cervical distraction and suboccipital release x10 min Supine: cervical distraction and suboccipital release x10 min    DNF 10x 10 sec --      HEP: exercises added to HEP will be bolded in flow sheet the day they are added and remain in the flowsheet bolded and unbolded if removed from HEP    Charges: Ex 2 MT 1       Total Timed Treatment: 45 min  Total Treatment Time: 45 min

## 2023-10-17 ENCOUNTER — OFFICE VISIT (OUTPATIENT)
Dept: PHYSICAL THERAPY | Age: 53
End: 2023-10-17
Attending: Other
Payer: COMMERCIAL

## 2023-10-17 PROCEDURE — 97140 MANUAL THERAPY 1/> REGIONS: CPT

## 2023-10-17 PROCEDURE — 97110 THERAPEUTIC EXERCISES: CPT

## 2023-10-17 NOTE — PROGRESS NOTES
Diagnosis:   Cervicalgia (M54.2)          Referring Provider: Kip Connor  Date of Evaluation:    9/11/2023    Precautions:  None Next MD visit:   none scheduled  Date of Surgery: n/a   Insurance Primary/Secondary: [employer] / N/A     # Auth Visits: 10 recommended            Subjective: no new problems. States she felt good after last session and no headache until this morning. Did take an excederin and it took the headache away. Overall she is feeling a lot better with neck pain and headaches. Denies headache pre-rx. Pain: 0/10 + left sided neck tightness      Objective:     Neck Disability Index Score  Score: 14 % (9/11/2023 10:59 AM)      Cervical AROM: (* denotes performed with pain)  Flexion: 45 deg  Extension: 58 deg  Sidebending: R 30 deg; L 40 deg  Rotation: R 90 deg; L 90 deg       Assessment: completed todays treatment without c/o increased pain or discomfort following treatment. Posture awareness continues to improve. Thoracic mobility improving. Headaches are decreasing. Overall making good progress towards goals. Goals:    (to be met in 10 visits)   Pt will have improved thoracic PA mobility to WNL to improve cervical ROM as well as promote upright posturing and decreased pain with computer work   Pt will report decreased frequency of headaches to <1x/week  Pt will improve postural strength (mid trap) to 4+/5 to promote improved upright posturing and decreased pain with working  PT to report ability to sleep without waking due to pain   Pt will be independent and compliant with comprehensive HEP to maintain progress achieved in PT     Plan: continue POC. Progress as tolerated.   Date:  10/9/2023               TX#: 4/10 Date: 10/12/2023                TX#: 5/10 Date: 10/17/2023  Tx#: 6/10   UBE retro x5 min UBE retro x5 min UBE x5 retro x5 min   Doorway pectoral stretch 3x10 sec Doorway pectoral stretch 3x10 sec Doorway pectoral stretch 3x10 sec   Wall press ups with plus x15 Wall press ups on YSB with plus x15 Wall press ups on YSB with plus x15   Back to wall: wall angels x10 Back to wall: wall angels x10 Back to wall: wall angels x10   GTB scapular rows 3x10  TRX low rows x10  TRX high rows x10 GTB scapular rows 3x10  TRX low rows x12  TRX high rows x12 GTB scapular rows 3x10  TRX low rows x15  TRX high rows x15    Self thoracic ext on FR 2x10 --   Supine on FR:   DNF 10x10 sec  alt OH flexion,bilat sh diag V, alt fwd reach, bilat HBH sh ER/H ADD x10 ea Supine on FR:   DNF 10x10 sec  alt OH flexion,bilat sh diag V, alt fwd reach, bilat HBH sh ER/H ADD x10 ea Supine on FR:   DNF 10x10 sec  alt OH flexion,bilat sh diag V, alt fwd reach, bilat HBH sh ER/H ADD x10 ea   Prone on bed: thoracic PA mobilizations Gr 4   Prone on bed: thoracic PA mobilizations Gr 4      Prone on bed: scapular rows, sh h abd x10 ea   Supine: cervical distraction and suboccipital release x10 min Supine: cervical distraction and suboccipital release x10 min Supine: cervical distraction and suboccipital release x10 min        HEP: exercises added to HEP will be bolded in flow sheet the day they are added and remain in the flowsheet bolded and unbolded if removed from HEP    Charges: Ex 2 MT 1       Total Timed Treatment: 45 min  Total Treatment Time: 45 min

## 2023-10-18 ENCOUNTER — OFFICE VISIT (OUTPATIENT)
Dept: INTERNAL MEDICINE CLINIC | Facility: CLINIC | Age: 53
End: 2023-10-18
Payer: COMMERCIAL

## 2023-10-18 VITALS
SYSTOLIC BLOOD PRESSURE: 114 MMHG | RESPIRATION RATE: 18 BRPM | OXYGEN SATURATION: 98 % | HEART RATE: 110 BPM | WEIGHT: 125 LBS | DIASTOLIC BLOOD PRESSURE: 60 MMHG | BODY MASS INDEX: 23 KG/M2 | HEIGHT: 62 IN

## 2023-10-18 DIAGNOSIS — Z51.81 THERAPEUTIC DRUG MONITORING: Primary | ICD-10-CM

## 2023-10-18 DIAGNOSIS — E66.9 OBESITY (BMI 30-39.9): ICD-10-CM

## 2023-10-18 PROCEDURE — 3008F BODY MASS INDEX DOCD: CPT | Performed by: INTERNAL MEDICINE

## 2023-10-18 PROCEDURE — 3074F SYST BP LT 130 MM HG: CPT | Performed by: INTERNAL MEDICINE

## 2023-10-18 PROCEDURE — 99213 OFFICE O/P EST LOW 20 MIN: CPT | Performed by: INTERNAL MEDICINE

## 2023-10-18 PROCEDURE — 3078F DIAST BP <80 MM HG: CPT | Performed by: INTERNAL MEDICINE

## 2023-10-18 RX ORDER — ESTRADIOL 0.05 MG/D
1 PATCH, EXTENDED RELEASE TRANSDERMAL
COMMUNITY

## 2023-10-18 RX ORDER — PROGESTERONE 100 MG/1
100 CAPSULE ORAL NIGHTLY
COMMUNITY

## 2023-10-19 ENCOUNTER — OFFICE VISIT (OUTPATIENT)
Dept: PHYSICAL THERAPY | Age: 53
End: 2023-10-19
Attending: Other
Payer: COMMERCIAL

## 2023-10-19 PROCEDURE — 97110 THERAPEUTIC EXERCISES: CPT

## 2023-10-19 PROCEDURE — 97140 MANUAL THERAPY 1/> REGIONS: CPT

## 2023-10-19 NOTE — PROGRESS NOTES
Diagnosis:   Cervicalgia (M54.2)          Referring Provider: Jacinto Nichols  Date of Evaluation:    9/11/2023    Precautions:  None Next MD visit:   none scheduled  Date of Surgery: n/a   Insurance Primary/Secondary: [employer] / N/A     # Auth Visits: 10 recommended            Subjective: no new problems. States that she did have a headache across forehead this morning while working at her computer. Took an excederin and the headache did resolve. The frequency of headache have decreased to 2x/week from 3-4x/week. Pain: 0/10 + left sided neck tightness      Objective:     Neck Disability Index Score  Score: 14 % (9/11/2023 10:59 AM)      Cervical AROM: (* denotes performed with pain)  Flexion: 45 deg  Extension: 58 deg  Sidebending: R 30 deg; L 40 deg  Rotation: R 90 deg; L 90 deg       Assessment: completed todays treatment without c/o increased pain or discomfort following treatment. Posture awareness continues to improve. Cervicogenic headaches are improving in frequency. Thoracic mobility continues to improve. Making very good progress towards goals. Goals:    (to be met in 10 visits)   Pt will have improved thoracic PA mobility to WNL to improve cervical ROM as well as promote upright posturing and decreased pain with computer work   Pt will report decreased frequency of headaches to <1x/week  Pt will improve postural strength (mid trap) to 4+/5 to promote improved upright posturing and decreased pain with working  PT to report ability to sleep without waking due to pain   Pt will be independent and compliant with comprehensive HEP to maintain progress achieved in PT     Plan: continue POC. Progress as tolerated.   Date:  10/9/2023               TX#: 4/10 Date: 10/12/2023                TX#: 5/10 Date: 10/17/2023  Tx#: 6/10 Date: 10/19/2023  Tx# 7/10   UBE retro x5 min UBE retro x5 min UBE x5 retro x5 min UBE x5 retro x5 min   Doorway pectoral stretch 3x10 sec Doorway pectoral stretch 3x10 sec Doorway pectoral stretch 3x10 sec Doorway pectoral stretch 3x10 sec   Wall press ups with plus x15 Wall press ups on YSB with plus x15 Wall press ups on YSB with plus x15 Wall press ups on YSB with plus x20   Back to wall: wall angels x10 Back to wall: wall angels x10 Back to wall: wall angels x10    GTB scapular rows 3x10  TRX low rows x10  TRX high rows x10 GTB scapular rows 3x10  TRX low rows x12  TRX high rows x12 GTB scapular rows 3x10  TRX low rows x15  TRX high rows x15 GTB scapular rows 3x10  TRX low rows x20  TRX high rows x20    Self thoracic ext on FR 2x10 --    Supine on FR:   DNF 10x10 sec  alt OH flexion,bilat sh diag V, alt fwd reach, bilat HBH sh ER/H ADD x10 ea Supine on FR:   DNF 10x10 sec  alt OH flexion,bilat sh diag V, alt fwd reach, bilat HBH sh ER/H ADD x10 ea Supine on FR:   DNF 10x10 sec  alt OH flexion,bilat sh diag V, alt fwd reach, bilat HBH sh ER/H ADD x10 ea Supine on FR:   DNF 10x10 sec  alt OH flexion,bilat sh diag V, alt fwd reach, bilat HBH sh ER/H ADD x10 ea   Prone on bed: thoracic PA mobilizations Gr 4   Prone on bed: thoracic PA mobilizations Gr 4  Prone on bed: thoracic PA mobilizations Gr 4      Prone on bed: scapular rows, sh h abd x10 ea Prone on bed: scapular rows, sh h abd x10 ea   Supine: cervical distraction and suboccipital release x10 min Supine: cervical distraction and suboccipital release x10 min Supine: cervical distraction and suboccipital release x10 min Supine: cervical distraction and suboccipital release x10 min         HEP: exercises added to HEP will be bolded in flow sheet the day they are added and remain in the flowsheet bolded and unbolded if removed from HEP    Charges: Ex 2 MT 1       Total Timed Treatment: 45 min  Total Treatment Time: 45 min

## 2023-10-23 ENCOUNTER — OFFICE VISIT (OUTPATIENT)
Dept: PHYSICAL THERAPY | Age: 53
End: 2023-10-23
Attending: Other
Payer: COMMERCIAL

## 2023-10-23 PROCEDURE — 97110 THERAPEUTIC EXERCISES: CPT

## 2023-10-23 PROCEDURE — 97140 MANUAL THERAPY 1/> REGIONS: CPT

## 2023-10-23 NOTE — PROGRESS NOTES
Diagnosis:   Cervicalgia (M54.2)          Referring Provider: Williams Brown  Date of Evaluation:    9/11/2023    Precautions:  None Next MD visit:   none scheduled  Date of Surgery: n/a   Insurance Primary/Secondary: [employer] [place] / N/A     # Auth Visits: 10 recommended            Subjective: no new problems. States that she did have a headache across forehead yesterday and resolved excederin and the headache did resolve. Has not noticed the cervico-genic headache in 2 weeks. Pain: 0/10 denies HA today. Objective:     Neck Disability Index Score  Score: 14 % (9/11/2023 10:59 AM)      Cervical AROM: (* denotes performed with pain)  Flexion: 45 deg  Extension: 58 deg  Sidebending: R 30 deg; L 40 deg  Rotation: R 90 deg; L 90 deg       Assessment: completed todays treatment without c/o increased pain or discomfort following treatment. Posture awareness continues to improve. Cervicogenic headaches are improving in frequency. Thoracic mobility continues to improve. Making very good progress towards goals. Her headaches now are frontal/forehead which I educated her on that are not related to her cervical-spine and maybe more related to a systemic issue (hormones, vision etc) and to discuss with her neurologist for management. Goals:    (to be met in 10 visits)   Pt will have improved thoracic PA mobility to WNL to improve cervical ROM as well as promote upright posturing and decreased pain with computer work   Pt will report decreased frequency of headaches to <1x/week  Pt will improve postural strength (mid trap) to 4+/5 to promote improved upright posturing and decreased pain with working  PT to report ability to sleep without waking due to pain   Pt will be independent and compliant with comprehensive HEP to maintain progress achieved in PT     Plan: continue POC. Progress as tolerated.   Date:  10/9/2023               TX#: 4/10 Date: 10/12/2023                TX#: 5/10 Date: 10/17/2023  Tx#: 6/10 Date: 10/19/2023  Tx# 7/10 Date: 10/23/2023  Tx# 8/10   UBE retro x5 min UBE retro x5 min UBE x5 retro x5 min UBE x5 retro x5 min UBE x5 retro    Doorway pectoral stretch 3x10 sec Doorway pectoral stretch 3x10 sec Doorway pectoral stretch 3x10 sec Doorway pectoral stretch 3x10 sec Doorway pectoral stretch 3x10 sec   Wall press ups with plus x15 Wall press ups on YSB with plus x15 Wall press ups on YSB with plus x15 Wall press ups on YSB with plus x20 Wall press ups on YSB with plus x20   Back to wall: wall angels x10 Back to wall: wall angels x10 Back to wall: wall angels x10  --   GTB scapular rows 3x10  TRX low rows x10  TRX high rows x10 GTB scapular rows 3x10  TRX low rows x12  TRX high rows x12 GTB scapular rows 3x10  TRX low rows x15  TRX high rows x15 GTB scapular rows 3x10  TRX low rows x20  TRX high rows x20 BTB scapular rows 3x10  TRX low rows x20  TRX high rows x20    Self thoracic ext on FR 2x10 --     Supine on FR:   DNF 10x10 sec  alt OH flexion,bilat sh diag V, alt fwd reach, bilat HBH sh ER/H ADD x10 ea Supine on FR:   DNF 10x10 sec  alt OH flexion,bilat sh diag V, alt fwd reach, bilat HBH sh ER/H ADD x10 ea Supine on FR:   DNF 10x10 sec  alt OH flexion,bilat sh diag V, alt fwd reach, bilat HBH sh ER/H ADD x10 ea Supine on FR:   DNF 10x10 sec  alt OH flexion,bilat sh diag V, alt fwd reach, bilat HBH sh ER/H ADD x10 ea Supine on FR:   DNF 10x10 sec  alt OH flexion,bilat sh diag V, alt fwd reach, bilat HBH sh ER/H ADD x10 ea   Prone on bed: thoracic PA mobilizations Gr 4   Prone on bed: thoracic PA mobilizations Gr 4  Prone on bed: thoracic PA mobilizations Gr 4  Prone on bed: thoracic PA mobilizations Gr 4      Prone on bed: scapular rows, sh h abd x10 ea Prone on bed: scapular rows, sh h abd x10 ea Prone on bed: scapular rows, sh h abd x10 ea   Supine: cervical distraction and suboccipital release x10 min Supine: cervical distraction and suboccipital release x10 min Supine: cervical distraction and suboccipital release x10 min Supine: cervical distraction and suboccipital release x10 min Supine: cervical distraction and suboccipital release x10 min          HEP: exercises added to HEP will be bolded in flow sheet the day they are added and remain in the flowsheet bolded and unbolded if removed from HEP    Charges: Ex 2 MT 1       Total Timed Treatment: 45 min  Total Treatment Time: 45 min

## 2023-10-26 ENCOUNTER — OFFICE VISIT (OUTPATIENT)
Dept: PHYSICAL THERAPY | Age: 53
End: 2023-10-26
Attending: Other
Payer: COMMERCIAL

## 2023-10-26 PROCEDURE — 97110 THERAPEUTIC EXERCISES: CPT

## 2023-10-26 PROCEDURE — 97140 MANUAL THERAPY 1/> REGIONS: CPT

## 2023-10-26 NOTE — PROGRESS NOTES
Diagnosis:   Cervicalgia (M54.2)          Referring Provider: India Gore  Date of Evaluation:    9/11/2023    Precautions:  None Next MD visit:   none scheduled  Date of Surgery: n/a   Insurance Primary/Secondary: [employer] [place] / N/A     # Auth Visits: 10 recommended            Subjective: no new problems. States she saw neurologist yesterday and is going to try an injectable medicine for her headaches that are not related to her neck. Took BP yesterday and was normal. States the neck pain and cervical headaches are better. States no cervical headache or neck pain since last session. Pain: 0/10 denies HA today. Objective:     Neck Disability Index Score  Score: 14 % (9/11/2023 10:59 AM)      Cervical AROM: (* denotes performed with pain)  Flexion: 45 deg  Extension: 58 deg  Sidebending: R 30 deg; L 40 deg  Rotation: R 90 deg; L 90 deg       Assessment: completed todays treatment without c/o increased pain or discomfort following treatment. Posture awareness continues to improve. Cervicogenic headaches are improving in frequency. Thoracic mobility continues to improve. Making very good progress towards goals. Reviewed and provided updated HEP program. Pt to try all HEP over the next week and follow up next week for re-eval.    Goals:    (to be met in 10 visits)   Pt will have improved thoracic PA mobility to WNL to improve cervical ROM as well as promote upright posturing and decreased pain with computer work   Pt will report decreased frequency of headaches to <1x/week  Pt will improve postural strength (mid trap) to 4+/5 to promote improved upright posturing and decreased pain with working  PT to report ability to sleep without waking due to pain   Pt will be independent and compliant with comprehensive HEP to maintain progress achieved in PT     Plan: RE-assess next session. .  Date: 10/17/2023  Tx#: 6/10 Date: 10/19/2023  Tx# 7/10 Date: 10/23/2023  Tx# 8/10 Date: 10/26/2023  Tx# 9/10   UBE x5 retro x5 min UBE x5 retro x5 min UBE x5 retro  UBE x5 retro    Doorway pectoral stretch 3x10 sec Doorway pectoral stretch 3x10 sec Doorway pectoral stretch 3x10 sec Doorway pectoral stretch 3x10 sec   Wall press ups on YSB with plus x15 Wall press ups on YSB with plus x20 Wall press ups on YSB with plus x20 Wall press ups on GSB with plus x20   Back to wall: wall angels x10  --    GTB scapular rows 3x10  TRX low rows x15  TRX high rows x15 GTB scapular rows 3x10  TRX low rows x20  TRX high rows x20 BTB scapular rows 3x10  TRX low rows x20  TRX high rows x20 BTB scapular rows 3x10  TRX low rows x20  TRX high rows x20   --      Supine on FR:   DNF 10x10 sec  alt OH flexion,bilat sh diag V, alt fwd reach, bilat HBH sh ER/H ADD x10 ea Supine on FR:   DNF 10x10 sec  alt OH flexion,bilat sh diag V, alt fwd reach, bilat HBH sh ER/H ADD x10 ea Supine on FR:   DNF 10x10 sec  alt OH flexion,bilat sh diag V, alt fwd reach, bilat HBH sh ER/H ADD x10 ea Supine on FR:   DNF 10x10 sec  alt OH flexion,bilat sh diag V, alt fwd reach, bilat HBH sh ER/H ADD x10 ea   Prone on bed: thoracic PA mobilizations Gr 4  Prone on bed: thoracic PA mobilizations Gr 4  Prone on bed: thoracic PA mobilizations Gr 4  Prone on bed: thoracic PA mobilizations Gr 4   Prone on bed: scapular rows, sh h abd x10 ea Prone on bed: scapular rows, sh h abd x10 ea Prone on bed: scapular rows, sh h abd x10 ea Prone on bed: scapular rows, sh h abd x10 ea   Supine: cervical distraction and suboccipital release x10 min Supine: cervical distraction and suboccipital release x10 min Supine: cervical distraction and suboccipital release x10 min Supine: cervical distraction and suboccipital release x10 min         HEP: exercises added to HEP will be bolded in flow sheet the day they are added and remain in the flowsheet bolded and unbolded if removed from HEP    Charges: Ex 2 MT 1       Total Timed Treatment: 45 min  Total Treatment Time: 45 min

## 2023-10-26 NOTE — PATIENT INSTRUCTIONS
Access Code: [email or web address]  URL: [employer]/  Date: 10/26/2023  Prepared by: Chidi Barroso    Exercises  - Doorway Pec Stretch at 60 Elevation  - 3 x daily - 7 x weekly - 1 sets - 3 reps - 10 sec hold  - Wall Push Up  - 3 x daily - 7 x weekly - 1 sets - 10 reps - 2 sec hold  - Standing Shoulder Row with Anchored Resistance  - 3 x daily - 7 x weekly - 1 sets - 15 reps - 3 sec hold  - Thoracic Foam Roll Mobilization Backstroke  - 1-2 x daily - 7 x weekly - 1 sets - 10 reps - 1 sec hold  - Thoracic Y on Foam Roll  - 1 x daily - 7 x weekly - 3 sets - 10 reps  - Supine Alternating Arm Punches on Foam Roll  - 1-2 x daily - 7 x weekly - 1 sets - 10 reps  - Supine Trunk Stretch on Foam Roll with Hands Behind Neck  - 1-2 x daily - 7 x weekly - 1 sets - 10 reps  - Thoracic Mobilization on Foam Roll - Hands Clasped  - 1-2 x daily - 7 x weekly - 2 sets - 10 reps - 1-2 hold  - Supine Deep Neck Flexor Training  - 3 x daily - 7 x weekly - 1 sets - 10 reps - 10 sec hold  - Prone Shoulder Row  - 1 x daily - 7 x weekly - 1 sets - 10 reps - 3 sec hold  - Prone Shoulder Extension  - 1 x daily - 7 x weekly - 1 sets - 10 reps - 3 sec hold  - Prone Shoulder Horizontal Abduction with Thumbs Up  - 1 x daily - 7 x weekly - 1 sets - 10 reps - 3 sec hold

## 2023-11-01 ENCOUNTER — OFFICE VISIT (OUTPATIENT)
Dept: PHYSICAL THERAPY | Age: 53
End: 2023-11-01
Attending: Other
Payer: COMMERCIAL

## 2023-11-01 PROCEDURE — 97110 THERAPEUTIC EXERCISES: CPT

## 2023-11-01 PROCEDURE — 97140 MANUAL THERAPY 1/> REGIONS: CPT

## 2023-11-01 NOTE — PROGRESS NOTES
Diagnosis:   Cervicalgia (M54.2)          Referring Provider: Arnulfo Reddy  Date of Evaluation:    9/11/2023    Precautions:  None Next MD visit:   none scheduled  Date of Surgery: n/a   Insurance Primary/Secondary: [employer] / N/A     # Auth Visits: 10 recommended            Discharge Summary  Pt has attended 10 visits in Physical Therapy. Glenn Kelley reports feeling 90% improvement in her pain and headaches. Reports cervicogenic headaches have decreased to less than 1/week. She still has about 2 headaches a week that are not related to her cervical spine and she is working with a neurologist to help with those headaches. She is sleeping without waking due to pain and able to complete ADLs without neck pain or headaches. She has met all goals at this time and will be discharged from PT to a [facility]. Objective:   Neck Disability Index Score  Score: 14 % (9/11/2023 10:59 AM)  Score: 4% 11/1/2023    Cervical AROM: (* denotes performed with pain)  Flexion: 45 deg  Extension: 60 deg  Sidebending: R 40 deg; L 40 deg  Rotation: R 90 deg; L 90 deg     Accessory motion: thoracic mobility: WNL  Palpation: no significant tenderness on palpation of right upper trap and scapular paraspinals     Strength: (* denotes performed with pain)  UE/Scapular   Shoulder Flex: R 5/5, L 5/5  Shoulder ABD (C5): R 5/5, L 5/5  Shoulder ER/IR: R/L: 5/5     Rhomboids: R 4+/5, L 4+/5  Mid trap: R 4+/5; L 4+/5      Flexibility:   UE/Scapular   Upper Trap: R WNL; L WNL  Levator Scap: R WNL; L WNL  Pec Major: R/L mild restrictions       Goals:   (to be met in 10 visits)   Pt will have improved thoracic PA mobility to WNL to improve cervical ROM as well as promote upright posturing and decreased pain with computer work. Met   Pt will report decreased frequency of headaches to <1x/week. Met  Pt will improve postural strength (mid trap) to 4+/5 to promote improved upright posturing and decreased pain with working.  Met   PT to report ability to sleep without waking due to pain. Met    Pt will be independent and compliant with comprehensive HEP to maintain progress achieved in PT. Met       Plan: Recommend to discharge patient from PT to HEP. Patient/Family/Caregiver was advised of these findings, precautions, and treatment options and has agreed to actively participate in planning and for this course of care. Thank you for your referral. If you have any questions, please contact me at Dept: [phone]. Sincerely,  Electronically signed by therapist: Hansel Foster PT    Physician's certification required: No  Please co-sign or sign and return this letter via fax as soon as possible to [phone]. I certify the need for these services furnished under this plan of treatment and while under my care. X___________________________________________________ Date____________________            Subjective: feels 90% better  Pain: 0/10 denies HA today.        Objective:       Date: 10/17/2023  Tx#: 6/10 Date: 10/19/2023  Tx# 7/10 Date: 10/23/2023  Tx# 8/10 Date: 10/26/2023  Tx# 9/10 DAte: 11/1/2023  Tx# 10/10   UBE x5 retro x5 min UBE x5 retro x5 min UBE x5 retro  UBE x5 retro  UBE x5 retro   Doorway pectoral stretch 3x10 sec Doorway pectoral stretch 3x10 sec Doorway pectoral stretch 3x10 sec Doorway pectoral stretch 3x10 sec Doorway pectoral stretch 3x10 sec   Wall press ups on YSB with plus x15 Wall press ups on YSB with plus x20 Wall press ups on YSB with plus x20 Wall press ups on GSB with plus x20 Wall press ups on GSB with plus x20   Back to wall: wall angels x10  --     GTB scapular rows 3x10  TRX low rows x15  TRX high rows x15 GTB scapular rows 3x10  TRX low rows x20  TRX high rows x20 BTB scapular rows 3x10  TRX low rows x20  TRX high rows x20 BTB scapular rows 3x10  TRX low rows x20  TRX high rows x20 --  TRX low rows x20  TRX high rows x20   --       Supine on FR:   DNF 10x10 sec  alt OH flexion,bilat sh diag V, alt fwd reach, bilat HBH sh ER/H ADD x10 ea Supine on FR:   DNF 10x10 sec  alt OH flexion,bilat sh diag V, alt fwd reach, bilat HBH sh ER/H ADD x10 ea Supine on FR:   DNF 10x10 sec  alt OH flexion,bilat sh diag V, alt fwd reach, bilat HBH sh ER/H ADD x10 ea Supine on FR:   DNF 10x10 sec  alt OH flexion,bilat sh diag V, alt fwd reach, bilat HBH sh ER/H ADD x10 ea --   Prone on bed: thoracic PA mobilizations Gr 4  Prone on bed: thoracic PA mobilizations Gr 4  Prone on bed: thoracic PA mobilizations Gr 4  Prone on bed: thoracic PA mobilizations Gr 4    Prone on bed: scapular rows, sh h abd x10 ea Prone on bed: scapular rows, sh h abd x10 ea Prone on bed: scapular rows, sh h abd x10 ea Prone on bed: scapular rows, sh h abd x10 ea    Supine: cervical distraction and suboccipital release x10 min Supine: cervical distraction and suboccipital release x10 min Supine: cervical distraction and suboccipital release x10 min Supine: cervical distraction and suboccipital release x10 min Supine: cervical distraction and suboccipital release x10 min       RE-assess   HEP: exercises added to HEP will be bolded in flow sheet the day they are added and remain in the flowsheet bolded and unbolded if removed from HEP    Charges: Ex 1 MT 1       Total Timed Treatment: 30 min  Total Treatment Time: 30 min

## 2023-11-13 RX ORDER — SEMAGLUTIDE 2.4 MG/.75ML
2.4 INJECTION, SOLUTION SUBCUTANEOUS WEEKLY
Qty: 3 ML | Refills: 0 | Status: SHIPPED | OUTPATIENT
Start: 2023-11-13

## 2023-11-13 NOTE — TELEPHONE ENCOUNTER
Requesting   Requested Prescriptions     Pending Prescriptions Disp Refills    WEGOVY 2.4 MG/0.75ML Subcutaneous Solution Auto-injector [Pharmacy Med Name: Laquitanerissa Cornelln 2.4 MG/0.75 ML PEN]  0     Sig: INJECT 0.75 ML (2.4 MG TOTAL) INTO THE SKIN ONCE A WEEK.      LOV: 10/18/23  RTC: not noted  Filled: 7/11/23 #9 with 0 refills    Future Appointments   Date Time Provider Shemar Vann   1/23/2024 12:00 PM Stevie Brewster MD [facility]

## 2023-12-19 ENCOUNTER — HOSPITAL ENCOUNTER (OUTPATIENT)
Dept: MAMMOGRAPHY | Age: 53
Discharge: HOME | End: 2023-12-19
Attending: OBSTETRICS & GYNECOLOGY
Payer: COMMERCIAL

## 2023-12-19 DIAGNOSIS — R92.8 ABNORMAL MAMMOGRAM: ICD-10-CM

## 2023-12-19 PROCEDURE — 77066 DX MAMMO INCL CAD BI: CPT | Performed by: OBSTETRICS & GYNECOLOGY

## 2023-12-19 PROCEDURE — 77062 BREAST TOMOSYNTHESIS BI: CPT | Performed by: OBSTETRICS & GYNECOLOGY

## 2023-12-21 PROBLEM — R92.30 DENSE BREAST TISSUE ON MAMMOGRAM: Status: ACTIVE | Noted: 2023-12-21

## 2024-01-12 NOTE — TELEPHONE ENCOUNTER
Requesting   Requested Prescriptions     Pending Prescriptions Disp Refills    WEGOVY 2.4 MG/0.75ML Subcutaneous Solution Auto-injector [Pharmacy Med Name: WEGOVY 2.4 MG/0.75 ML PEN]  0     Sig: INJECT 0.75 MG INTO THE SKIN ONCE A WEEK     LOV: 10/18/23  RTC: not noted  Filled: 11/13/23 #3 with 0 refills    Future Appointments   Date Time Provider Department Center   4/30/2024  1:00 PM Luz Maria Streeter MD [facility]

## 2024-01-14 RX ORDER — SEMAGLUTIDE 2.4 MG/.75ML
0.75 INJECTION, SOLUTION SUBCUTANEOUS WEEKLY
Qty: 3 ML | Refills: 0 | Status: SHIPPED | OUTPATIENT
Start: 2024-01-14

## 2024-01-22 ENCOUNTER — TELEPHONE (OUTPATIENT)
Dept: INTERNAL MEDICINE CLINIC | Facility: CLINIC | Age: 54
End: 2024-01-22

## 2024-01-22 NOTE — TELEPHONE ENCOUNTER
PA needed for Wegovy 2.4 mg can be done on [facility]  [id number]  Will try to enter in epic  Awaiting questions.

## 2024-01-23 NOTE — TELEPHONE ENCOUNTER
Approved    Prior authorization approved Case ID: [id number]      Payer: [employer]    [phone]    [phone]   CaseId:[id number];Status:Approved;Review Type:Prior Auth;Coverage Start Date:01/01/2024;Coverage End Date:01/30/2025;   Approval Details    Authorized from January 1, 2024 to January 30, 2025      Electronic appeal: Not supported   View History    Medication Being Authorized     semaglutide-weight management (WEGOVY) 2.4

## 2024-01-26 ENCOUNTER — OFFICE VISIT (OUTPATIENT)
Dept: FAMILY MEDICINE CLINIC | Facility: CLINIC | Age: 54
End: 2024-01-26
Payer: COMMERCIAL

## 2024-01-26 VITALS
DIASTOLIC BLOOD PRESSURE: 70 MMHG | SYSTOLIC BLOOD PRESSURE: 110 MMHG | HEART RATE: 88 BPM | RESPIRATION RATE: 16 BRPM | WEIGHT: 128 LBS | OXYGEN SATURATION: 98 % | TEMPERATURE: 99 F | HEIGHT: 62 IN | BODY MASS INDEX: 23.55 KG/M2

## 2024-01-26 DIAGNOSIS — R21 RASH: Primary | ICD-10-CM

## 2024-01-26 PROCEDURE — 3008F BODY MASS INDEX DOCD: CPT | Performed by: FAMILY MEDICINE

## 2024-01-26 PROCEDURE — 3078F DIAST BP <80 MM HG: CPT | Performed by: FAMILY MEDICINE

## 2024-01-26 PROCEDURE — 99213 OFFICE O/P EST LOW 20 MIN: CPT | Performed by: FAMILY MEDICINE

## 2024-01-26 PROCEDURE — 3074F SYST BP LT 130 MM HG: CPT | Performed by: FAMILY MEDICINE

## 2024-01-26 RX ORDER — MOMETASONE FUROATE 1 MG/G
1 CREAM TOPICAL 2 TIMES DAILY PRN
Qty: 45 G | Refills: 0 | Status: SHIPPED | OUTPATIENT
Start: 2024-01-26 | End: 2025-01-20

## 2024-01-26 NOTE — PROGRESS NOTES
CHIEF COMPLAINT:     Chief Complaint   Patient presents with    Rash     Rash on right lower leg  for 2-3 weeks            HPI:    Charity Ward is a 53 year old female who presents for evaluation of a rash.  Per patient rash started in the past 2-3 weeks. Rash has been stable since onset.  Patient has not had similar rash in the past. The rash is characterized by slightly red area on right medial shin. Patient has treated rash with otc moisturizer.  Associated symptoms include: none. Denies significant itching  Exposure: none known. Pt reports hx of eczema on b/l wrists in the past, but has not had any flare-up so far this season.   Denies any recent close contact to the area by any new clothing or footwear. No new detergents or soaps.     Pertinent negatives include no anorexia, congestion, cough, diarrhea, eye pain, facial edema, fatigue, fever, joint pain, rhinorrhea, shortness of breath, sore throat or vomiting.      Current Outpatient Medications   Medication Sig Dispense Refill    PREDNISONE OR Take by mouth.      Mometasone Furoate 0.1 % External Cream Apply 1 Application topically 2 (two) times daily as needed. 45 g 0    semaglutide-weight management (WEGOVY) 2.4 MG/0.75ML Subcutaneous Solution Auto-injector Inject 0.23 mL (0.736 mg total) into the skin once a week. 3 mL 0    progesterone 100 MG Oral Cap Take 1 capsule (100 mg total) by mouth nightly.      estradiol 0.05 MG/24HR Transdermal Patch Biweekly Place 1 patch onto the skin twice a week.      ubrogepant (UBRELVY) 100 MG Oral Tab Take 100 mg by mouth.      Phentermine HCl 37.5 MG Oral Tab Take 1 tablet (37.5 mg total) by mouth every morning before breakfast. (Patient not taking: Reported on 1/26/2024) 30 tablet 2    ergocalciferol 1.25 MG (86731 UT) Oral Cap Take 1 capsule (50,000 Units total) by mouth every 7 days. (Patient not taking: Reported on 1/26/2024) 12 capsule 1    ondansetron 4 MG Oral Tablet Dispersible Take 1 tablet (4 mg total) by  mouth every 8 (eight) hours as needed for Nausea. 10 tablet 0     No current facility-administered medications for this visit.      Past Medical History:   Diagnosis Date    Acute pharyngitis     Acute upper respiratory infections of unspecified site     Bronchitis, not specified as acute or chronic     Hand tingling     Headache(784.0)     Mastodynia     Migraine, unspecified, without mention of intractable migraine without mention of status migrainosus     Oral aphthae     Other chest pain     Other malaise and fatigue     Personal history of pneumonia (recurrent)     Sebaceous cyst     Spasm of muscle     Unspecified sinusitis (chronic)     Viral warts, unspecified       Past Surgical History:   Procedure Laterality Date      04      Family History   Problem Relation Age of Onset    Lipids Father     Cancer Father         colon cancer in his 60's    Hypertension Father     Cancer Mother       Social History     Socioeconomic History    Marital status:    Tobacco Use    Smoking status: Never     Passive exposure: Never    Smokeless tobacco: Never   Vaping Use    Vaping Use: Never used   Substance and Sexual Activity    Alcohol use: No    Drug use: No    Sexual activity: Yes     Partners: Male     Birth control/protection: Condom   Other Topics Concern    Caffeine Concern Yes     Comment: 1 daily    Exercise No         REVIEW OF SYSTEMS:   GENERAL: feels well otherwise, no fever, no chills.  SKIN: Per HPI. No edema. No ulcerations.  HEENT: Denies rhinorrhea, edema of the lips or swelling of throat.  CARDIOVASCULAR: Denies chest pains or palpitations.  LUNGS: Denies shortness of breath with exertion or rest. No cough or wheezing.  LYMPH: Denies enlargement of the lymph nodes.  NEURO: Denies abnormal sensation, tingling of the skin, or numbness.      EXAM:   /70   Pulse 88   Temp 98.7 °F (37.1 °C) (Temporal)   Resp 16   Ht 5' 2\" (1.575 m)   Wt 128 lb (58.1 kg)   SpO2 98%   BMI 23.41  kg/m²   GENERAL: well developed, well nourished,in no apparent distress  SKIN: right medial shin with 2cm x 3 cm area of jillian pink erythema. The skin texture is drier, but not scaly. There is no sign of vesicular or pustular development. No leading border or central clearing.   LUNGS: Clear to auscultation bilaterally.  No wheezing, rhonchi, or rales.  No diminished breath sounds. No increased work of breathing.   CARDIO: RRR without murmur  LYMPH: No lymphadenopathy.     ASSESSMENT AND PLAN:   Charity Ward is a 53 year old female who presents for evaluation of a rash. Findings are consistent with:    ASSESSMENT:  Encounter Diagnosis   Name Primary?    Rash Yes       PLAN: Meds as listed below.  Comfort measures as described in Patient Instructions.  Skin care discussed with patient.     Meds & Refills for this Visit:  Requested Prescriptions     Signed Prescriptions Disp Refills    Mometasone Furoate 0.1 % External Cream 45 g 0     Sig: Apply 1 Application topically 2 (two) times daily as needed.         Risk and benefits of medication discussed.   Patient Instructions   Apply mometasone cream to the affected area twice daily.   Once symptoms resolve you can maintain skin integrity with daily moisturizer like eucerin or aquaphor and use mometasone as needed to prevent flare-ups.   If symptoms are no better in 1 week or if they worsen, please follow-up with your PCP for further evaluation.     The patient indicates understanding of these issues and agrees to the plan.  The patient is asked to return in 3 days if sx persist or worsen

## 2024-01-26 NOTE — PATIENT INSTRUCTIONS
Apply mometasone cream to the affected area twice daily.   Once symptoms resolve you can maintain skin integrity with daily moisturizer like eucerin or aquaphor and use mometasone as needed to prevent flare-ups.   If symptoms are no better in 1 week or if they worsen, please follow-up with your PCP for further evaluation.

## 2024-02-28 DIAGNOSIS — E66.9 OBESITY (BMI 30-39.9): ICD-10-CM

## 2024-02-28 DIAGNOSIS — N95.1 PERIMENOPAUSAL: ICD-10-CM

## 2024-02-28 DIAGNOSIS — Z51.81 THERAPEUTIC DRUG MONITORING: Primary | ICD-10-CM

## 2024-02-28 NOTE — TELEPHONE ENCOUNTER
Requesting   Requested Prescriptions     Pending Prescriptions Disp Refills    WEGOVY 2.4 MG/0.75ML Subcutaneous Solution Auto-injector [Pharmacy Med Name: WEGOVY 2.4 MG/0.75 ML PEN]  0     Sig: INJECT 0.23 ML (0.736 MG TOTAL) INTO THE SKIN ONCE A WEEK.    PHENTERMINE HCL 37.5 MG Oral Tab [Pharmacy Med Name: PHENTERMINE 37.5 MG TABLET] 30 tablet 0     Sig: TAKE 1 TABLET BY MOUTH EVERY MORNING BEFORE BREAKFAST     LOV: 10/18/23  RTC: not noted  Filled: phen 7/11/23 #30 with 2 refills  Wegovy 1/14/24 #3 with 0 refills    Future Appointments   Date Time Provider Department Center   4/30/2024  1:00 PM Luz Maria Streeter MD [facility]     Per note  an 10/18 pt was taking phentermine prn

## 2024-02-29 RX ORDER — SEMAGLUTIDE 2.4 MG/.75ML
0.75 INJECTION, SOLUTION SUBCUTANEOUS WEEKLY
Qty: 3 ML | Refills: 2 | Status: SHIPPED | OUTPATIENT
Start: 2024-02-29

## 2024-02-29 RX ORDER — PHENTERMINE HYDROCHLORIDE 37.5 MG/1
37.5 TABLET ORAL
Qty: 30 TABLET | Refills: 2 | Status: SHIPPED | OUTPATIENT
Start: 2024-02-29

## 2024-09-06 ENCOUNTER — HOSPITAL ENCOUNTER (OUTPATIENT)
Dept: MAMMOGRAPHY | Age: 54
Discharge: HOME | End: 2024-09-06
Attending: OBSTETRICS & GYNECOLOGY
Payer: COMMERCIAL

## 2024-09-06 DIAGNOSIS — R92.8 ABNORMAL MAMMOGRAM: ICD-10-CM

## 2024-09-06 PROCEDURE — 77062 BREAST TOMOSYNTHESIS BI: CPT | Performed by: OBSTETRICS & GYNECOLOGY

## 2024-09-06 PROCEDURE — 77066 DX MAMMO INCL CAD BI: CPT | Performed by: OBSTETRICS & GYNECOLOGY

## 2024-09-11 ENCOUNTER — OFFICE VISIT (OUTPATIENT)
Dept: INTERNAL MEDICINE CLINIC | Facility: CLINIC | Age: 54
End: 2024-09-11
Payer: COMMERCIAL

## 2024-09-11 VITALS
HEART RATE: 72 BPM | BODY MASS INDEX: 25.95 KG/M2 | DIASTOLIC BLOOD PRESSURE: 74 MMHG | RESPIRATION RATE: 18 BRPM | HEIGHT: 62 IN | SYSTOLIC BLOOD PRESSURE: 120 MMHG | WEIGHT: 141 LBS

## 2024-09-11 DIAGNOSIS — E66.9 OBESITY (BMI 30-39.9): ICD-10-CM

## 2024-09-11 DIAGNOSIS — Z51.81 THERAPEUTIC DRUG MONITORING: Primary | ICD-10-CM

## 2024-09-11 DIAGNOSIS — R53.83 FATIGUE, UNSPECIFIED TYPE: ICD-10-CM

## 2024-09-11 DIAGNOSIS — E55.9 VITAMIN D DEFICIENCY: ICD-10-CM

## 2024-09-11 DIAGNOSIS — N95.1 PERIMENOPAUSAL: ICD-10-CM

## 2024-09-11 PROCEDURE — 99214 OFFICE O/P EST MOD 30 MIN: CPT | Performed by: INTERNAL MEDICINE

## 2024-09-11 RX ORDER — DIETHYLPROPION HYDROCHLORIDE 75 MG/1
1 TABLET, EXTENDED RELEASE ORAL EVERY MORNING
COMMUNITY
Start: 2024-09-02

## 2024-09-11 NOTE — PROGRESS NOTES
HISTORY OF PRESENT ILLNESS  Chief Complaint   Patient presents with    Weight Check     Up 16        Charity Ward is a 53 year old female here for follow up in medical weight loss program.     Denies chest pain, shortness of breath, dizziness, blurred vision, headache, paresthesia, nausea/vomiting.   Up 16 lb   Have not filled since April and not consistently on medication due to shortage and fell of medication   Resume  wegovy 2.4 mg q weekly this past month   Not consistent with the gym       Wt Readings from Last 6 Encounters:   09/11/24 141 lb (64 kg)   04/30/24 138 lb (62.6 kg)   01/26/24 128 lb (58.1 kg)   10/18/23 125 lb (56.7 kg)   07/11/23 126 lb (57.2 kg)   03/16/23 125 lb (56.7 kg)            Breakfast Lunch Dinner Snacks Fluids   Reviewed             REVIEW OF SYSTEMS  GENERAL HEALTH: feels well otherwise, denied any fevers chills or night sweats   RESPIRATORY: denies shortness of breath   CARDIOVASCULAR: denies chest pain  GI: denies abdominal pain    EXAM  /74   Pulse 72   Resp 18   Ht 5' 2\" (1.575 m)   Wt 141 lb (64 kg)   BMI 25.79 kg/m²         GENERAL: well developed, well nourished,in no apparent distress, A/O x3  SKIN: no rashes,no suspicious lesions  HEENT: atraumatic, normocephalic, OP-clear, PERRL  NECK: supple,no adenopathy  LUNGS: clear to auscultation bilaterally   CARDIO: RRR without murmur  GI: good BS's,NT/ND, no masses or HSM  EXTREMITIES: no cyanosis, no clubbing, no edema    Lab Results   Component Value Date    WBC 6.35 03/07/2022    RBC 4.87 03/07/2022    HGB 14.2 03/07/2022    HCT 45.3 03/07/2022    MCV 91.2 02/18/2023    MCH 29.2 03/07/2022    MCHC 31.3 02/18/2023    RDW 12.9 03/07/2022     03/07/2022     Lab Results   Component Value Date    GLU 90 03/07/2022    BUN 16.0 03/07/2022    BUNCREA 20.0 03/07/2022    CREATSERUM 0.82 03/07/2022    GFR >59 11/30/2009    GFRNAA 93 09/09/2011    GFRAA 108 09/09/2011    CA 10.2 03/07/2022    ALKPHO 69 02/18/2023    AST  16 02/18/2023    ALT 20 02/18/2023    BILT 0.7 02/18/2023    TP 6.3 (L) 02/18/2023    ALB 3.3 (L) 02/18/2023    GLOBULIN 2.7 09/09/2011    AGRATIO 1.7 03/21/2015     03/07/2022    K 5.14 (H) 03/07/2022     03/07/2022    CO2 20.9 (L) 03/07/2022     No results found for: \"EAG\", \"A1C\"  Lab Results   Component Value Date    CHOLEST 176.00 03/07/2022    TRIG 55.00 03/07/2022    HDL 70 03/07/2022    LDL 95 03/07/2022    VLDL 11 03/07/2022    TCHDLRATIO 2.4 09/09/2011     Lab Results   Component Value Date    TSH 0.708 03/07/2022     Lab Results   Component Value Date    B12 740 03/07/2022    VITB12 478 09/09/2011     Lab Results   Component Value Date    VITD 22.96 (L) 03/07/2022       Current Outpatient Medications on File Prior to Visit   Medication Sig Dispense Refill    Diethylpropion HCl ER 75 MG Oral Tablet 24 Hr Take 1 tablet (75 mg total) by mouth every morning.      semaglutide-weight management (WEGOVY) 2.4 MG/0.75ML Subcutaneous Solution Auto-injector Inject 0.23 mL (0.736 mg total) into the skin once a week. 9 mL 1    PREDNISONE OR Take by mouth.      progesterone 100 MG Oral Cap Take 1 capsule (100 mg total) by mouth nightly.      estradiol 0.05 MG/24HR Transdermal Patch Biweekly Place 1 patch onto the skin twice a week.      ubrogepant (UBRELVY) 100 MG Oral Tab Take 100 mg by mouth.      ondansetron 4 MG Oral Tablet Dispersible Take 1 tablet (4 mg total) by mouth every 8 (eight) hours as needed for Nausea. 10 tablet 0    ergocalciferol 1.25 MG (20937 UT) Oral Cap Take 1 capsule (50,000 Units total) by mouth every 7 days. (Patient not taking: Reported on 9/11/2024) 12 capsule 1     No current facility-administered medications on file prior to visit.       ASSESSMENT  Analyzed weight data:       Diagnoses and all orders for this visit:    Therapeutic drug monitoring  -     CBC With Differential With Platelet; Future  -     B12 AND FOLATE; Future  -     Comp Metabolic Panel (14); Future  -      Vitamin D; Future  -     TSH and Free T4; Future  -     Lipid Panel; Future  -     Ferritin; Future  -     Hemoglobin A1C; Future  -     Iron And Tibc; Future    Obesity (BMI 30-39.9)  -     CBC With Differential With Platelet; Future  -     B12 AND FOLATE; Future  -     Comp Metabolic Panel (14); Future  -     Vitamin D; Future  -     TSH and Free T4; Future  -     Lipid Panel; Future  -     Ferritin; Future  -     Hemoglobin A1C; Future  -     Iron And Tibc; Future    Perimenopausal  -     CBC With Differential With Platelet; Future  -     B12 AND FOLATE; Future  -     Comp Metabolic Panel (14); Future  -     Vitamin D; Future  -     TSH and Free T4; Future  -     Lipid Panel; Future  -     Ferritin; Future  -     Hemoglobin A1C; Future  -     Iron And Tibc; Future    Vitamin D deficiency  -     CBC With Differential With Platelet; Future  -     B12 AND FOLATE; Future  -     Comp Metabolic Panel (14); Future  -     Vitamin D; Future  -     TSH and Free T4; Future  -     Lipid Panel; Future  -     Ferritin; Future  -     Hemoglobin A1C; Future  -     Iron And Tibc; Future    Fatigue, unspecified type  -     CBC With Differential With Platelet; Future  -     B12 AND FOLATE; Future  -     Comp Metabolic Panel (14); Future  -     Vitamin D; Future  -     TSH and Free T4; Future  -     Lipid Panel; Future  -     Ferritin; Future  -     Hemoglobin A1C; Future  -     Iron And Tibc; Future    Other orders  -     semaglutide-weight management 2.4 MG/0.75ML Subcutaneous Solution Auto-injector; Inject 0.75 mL (2.4 mg total) into the skin once a week for 16 doses.          PLAN  Initial consult 183 lb on 10/30/19  Due for labwork   Up on weight   Up 16 lb   Down 42 lb   Total time spent on chart review, pre-charting, obtaining history, counseling, and educating, reviewing labs was 30 minutes.  Continue wegovy 2.4 mg q weekly   Continue diethylpropion for breakthrough symptoms   -advised of side effects and adverse effects of  this medication  Reviewed methods to incorporate exercise: focus on strength traiing  Start working on more protein, fiber, fruits, vegetables  Nutrition: low carb diet/ recommended to eat breakfast daily/ regular protein intake  Medication use and side effects reviewed with patient.  Medication contraindications: n/a  Fitness resources given   Follow up with dietitian and psychologist as recommended.  Discussed the role of sleep and stress in weight management.  Counseled on comprehensive weight loss plan including attention to nutrition, exercise and behavior/stress management for success. See patient instruction below for more details.  Discussed strategies to overcome barriers to successful weight loss and weight maintenance  FITTE: ACSM recommendations (150-300 minutes/ week in active weight loss)   Weight Loss consent to treat reviewed and signed     There are no Patient Instructions on file for this visit.    No follow-ups on file.    Patient verbalizes understanding.    Luz Maria Streeter MD

## 2024-09-21 ENCOUNTER — LAB ENCOUNTER (OUTPATIENT)
Dept: LAB | Age: 54
End: 2024-09-21
Attending: INTERNAL MEDICINE
Payer: COMMERCIAL

## 2024-09-21 DIAGNOSIS — R53.83 FATIGUE, UNSPECIFIED TYPE: ICD-10-CM

## 2024-09-21 DIAGNOSIS — E66.9 OBESITY (BMI 30-39.9): ICD-10-CM

## 2024-09-21 DIAGNOSIS — E55.9 VITAMIN D DEFICIENCY: ICD-10-CM

## 2024-09-21 DIAGNOSIS — Z51.81 THERAPEUTIC DRUG MONITORING: ICD-10-CM

## 2024-09-21 DIAGNOSIS — N95.1 PERIMENOPAUSAL: ICD-10-CM

## 2024-09-21 LAB
ALBUMIN SERPL-MCNC: 4.4 G/DL (ref 3.2–4.8)
ALBUMIN/GLOB SERPL: 1.6 {RATIO} (ref 1–2)
ALP LIVER SERPL-CCNC: 63 U/L
ALT SERPL-CCNC: 11 U/L
ANION GAP SERPL CALC-SCNC: 6 MMOL/L (ref 0–18)
AST SERPL-CCNC: 17 U/L (ref ?–34)
BASOPHILS # BLD AUTO: 0.03 X10(3) UL (ref 0–0.2)
BASOPHILS NFR BLD AUTO: 0.6 %
BILIRUB SERPL-MCNC: 0.8 MG/DL (ref 0.3–1.2)
BUN BLD-MCNC: 12 MG/DL (ref 9–23)
CALCIUM BLD-MCNC: 9.7 MG/DL (ref 8.7–10.4)
CHLORIDE SERPL-SCNC: 103 MMOL/L (ref 98–112)
CHOLEST SERPL-MCNC: 193 MG/DL (ref ?–200)
CO2 SERPL-SCNC: 26 MMOL/L (ref 21–32)
CREAT BLD-MCNC: 0.85 MG/DL
DEPRECATED HBV CORE AB SER IA-ACNC: 96 NG/ML
EGFRCR SERPLBLD CKD-EPI 2021: 81 ML/MIN/1.73M2 (ref 60–?)
EOSINOPHIL # BLD AUTO: 0.18 X10(3) UL (ref 0–0.7)
EOSINOPHIL NFR BLD AUTO: 3.5 %
ERYTHROCYTE [DISTWIDTH] IN BLOOD BY AUTOMATED COUNT: 11.9 %
EST. AVERAGE GLUCOSE BLD GHB EST-MCNC: 108 MG/DL (ref 68–126)
FASTING PATIENT LIPID ANSWER: YES
FASTING STATUS PATIENT QL REPORTED: YES
FOLATE SERPL-MCNC: 17.4 NG/ML (ref 5.4–?)
GLOBULIN PLAS-MCNC: 2.8 G/DL (ref 2–3.5)
GLUCOSE BLD-MCNC: 83 MG/DL (ref 70–99)
HBA1C MFR BLD: 5.4 % (ref ?–5.7)
HCT VFR BLD AUTO: 42.4 %
HDLC SERPL-MCNC: 64 MG/DL (ref 40–59)
HGB BLD-MCNC: 14.2 G/DL
IMM GRANULOCYTES # BLD AUTO: 0.02 X10(3) UL (ref 0–1)
IMM GRANULOCYTES NFR BLD: 0.4 %
IRON SATN MFR SERPL: 40 %
IRON SERPL-MCNC: 115 UG/DL
LDLC SERPL CALC-MCNC: 121 MG/DL (ref ?–100)
LYMPHOCYTES # BLD AUTO: 1.34 X10(3) UL (ref 1–4)
LYMPHOCYTES NFR BLD AUTO: 25.8 %
MCH RBC QN AUTO: 29.5 PG (ref 26–34)
MCHC RBC AUTO-ENTMCNC: 33.5 G/DL (ref 31–37)
MCV RBC AUTO: 88 FL
MONOCYTES # BLD AUTO: 0.65 X10(3) UL (ref 0.1–1)
MONOCYTES NFR BLD AUTO: 12.5 %
NEUTROPHILS # BLD AUTO: 2.98 X10 (3) UL (ref 1.5–7.7)
NEUTROPHILS # BLD AUTO: 2.98 X10(3) UL (ref 1.5–7.7)
NEUTROPHILS NFR BLD AUTO: 57.2 %
NONHDLC SERPL-MCNC: 129 MG/DL (ref ?–130)
OSMOLALITY SERPL CALC.SUM OF ELEC: 279 MOSM/KG (ref 275–295)
PLATELET # BLD AUTO: 300 10(3)UL (ref 150–450)
POTASSIUM SERPL-SCNC: 4.7 MMOL/L (ref 3.5–5.1)
PROT SERPL-MCNC: 7.2 G/DL (ref 5.7–8.2)
RBC # BLD AUTO: 4.82 X10(6)UL
SODIUM SERPL-SCNC: 135 MMOL/L (ref 136–145)
T4 FREE SERPL-MCNC: 1.5 NG/DL (ref 0.8–1.7)
TOTAL IRON BINDING CAPACITY: 289 UG/DL (ref 250–425)
TRANSFERRIN SERPL-MCNC: 230 MG/DL (ref 250–380)
TRIGL SERPL-MCNC: 44 MG/DL (ref 30–149)
TSI SER-ACNC: 0.92 MIU/ML (ref 0.55–4.78)
VIT B12 SERPL-MCNC: 581 PG/ML (ref 211–911)
VIT D+METAB SERPL-MCNC: 36.2 NG/ML (ref 30–100)
VLDLC SERPL CALC-MCNC: 8 MG/DL (ref 0–30)
WBC # BLD AUTO: 5.2 X10(3) UL (ref 4–11)

## 2024-09-21 PROCEDURE — 82306 VITAMIN D 25 HYDROXY: CPT

## 2024-09-21 PROCEDURE — 84439 ASSAY OF FREE THYROXINE: CPT

## 2024-09-21 PROCEDURE — 83036 HEMOGLOBIN GLYCOSYLATED A1C: CPT

## 2024-09-21 PROCEDURE — 84443 ASSAY THYROID STIM HORMONE: CPT

## 2024-09-21 PROCEDURE — 36415 COLL VENOUS BLD VENIPUNCTURE: CPT

## 2024-09-21 PROCEDURE — 83550 IRON BINDING TEST: CPT

## 2024-09-21 PROCEDURE — 80053 COMPREHEN METABOLIC PANEL: CPT

## 2024-09-21 PROCEDURE — 82607 VITAMIN B-12: CPT

## 2024-09-21 PROCEDURE — 82728 ASSAY OF FERRITIN: CPT

## 2024-09-21 PROCEDURE — 80061 LIPID PANEL: CPT

## 2024-09-21 PROCEDURE — 82746 ASSAY OF FOLIC ACID SERUM: CPT

## 2024-09-21 PROCEDURE — 83540 ASSAY OF IRON: CPT

## 2024-09-21 PROCEDURE — 85025 COMPLETE CBC W/AUTO DIFF WBC: CPT

## 2024-10-25 ENCOUNTER — OFFICE VISIT (OUTPATIENT)
Dept: FAMILY MEDICINE CLINIC | Facility: CLINIC | Age: 54
End: 2024-10-25
Payer: COMMERCIAL

## 2024-10-25 VITALS
BODY MASS INDEX: 24.84 KG/M2 | TEMPERATURE: 98 F | WEIGHT: 135 LBS | HEART RATE: 90 BPM | OXYGEN SATURATION: 98 % | SYSTOLIC BLOOD PRESSURE: 90 MMHG | RESPIRATION RATE: 18 BRPM | DIASTOLIC BLOOD PRESSURE: 60 MMHG | HEIGHT: 62 IN

## 2024-10-25 DIAGNOSIS — J01.00 ACUTE NON-RECURRENT MAXILLARY SINUSITIS: Primary | ICD-10-CM

## 2024-10-25 NOTE — PROGRESS NOTES
CHIEF COMPLAINT:     Chief Complaint   Patient presents with    Cold     Cough, loss of voice, and mucus . For 12 days     OTC: Sudafed, dayquill, nyquill, and flonase        HPI:   Charity Ward is a 54 year old female who presents for sinus congestion for  12  days. Symptoms have been worsening since onset. Sinus congestion/pain is described as a pressure and is located mainly in the maxillary sinuses.  Reports thick, post nasal discharge. Has treated symptoms with sudafed, dayquil, nyquil, flonase.  Patient also reports headache, cough, fullness in ears, sore throat.  Denies fever, dental pain, tinnitus, N/V/D.        Current Outpatient Medications   Medication Sig Dispense Refill    amoxicillin clavulanate 875-125 MG Oral Tab Take 1 tablet by mouth 2 (two) times daily for 10 days. 20 tablet 0    Diethylpropion HCl ER 75 MG Oral Tablet 24 Hr Take 1 tablet (75 mg total) by mouth every morning.      semaglutide-weight management 2.4 MG/0.75ML Subcutaneous Solution Auto-injector Inject 0.75 mL (2.4 mg total) into the skin once a week for 16 doses. 3 mL 3    semaglutide-weight management (WEGOVY) 2.4 MG/0.75ML Subcutaneous Solution Auto-injector Inject 0.23 mL (0.736 mg total) into the skin once a week. 9 mL 1    PREDNISONE OR Take by mouth.      progesterone 100 MG Oral Cap Take 1 capsule (100 mg total) by mouth nightly.      estradiol 0.05 MG/24HR Transdermal Patch Biweekly Place 1 patch onto the skin twice a week.      ubrogepant (UBRELVY) 100 MG Oral Tab Take 100 mg by mouth.      ergocalciferol 1.25 MG (02046 UT) Oral Cap Take 1 capsule (50,000 Units total) by mouth every 7 days. (Patient not taking: Reported on 9/11/2024) 12 capsule 1    ondansetron 4 MG Oral Tablet Dispersible Take 1 tablet (4 mg total) by mouth every 8 (eight) hours as needed for Nausea. 10 tablet 0      Past Medical History:    Acute pharyngitis    Acute upper respiratory infections of unspecified site    Bronchitis, not specified as acute  or chronic    Hand tingling    Headache(784.0)    Mastodynia    Migraine, unspecified, without mention of intractable migraine without mention of status migrainosus    Oral aphthae    Other chest pain    Other malaise and fatigue    Personal history of pneumonia (recurrent)    Sebaceous cyst    Spasm of muscle    Unspecified sinusitis (chronic)    Viral warts, unspecified      Past Surgical History:   Procedure Laterality Date      04      Family History   Problem Relation Age of Onset    Lipids Father     Cancer Father         colon cancer in his 60's    Hypertension Father     Cancer Mother       Social History     Socioeconomic History    Marital status:    Tobacco Use    Smoking status: Never     Passive exposure: Never    Smokeless tobacco: Never   Vaping Use    Vaping status: Never Used   Substance and Sexual Activity    Alcohol use: No    Drug use: No    Sexual activity: Yes     Partners: Male     Birth control/protection: Condom   Other Topics Concern    Caffeine Concern Yes     Comment: 1 daily    Exercise No     Social Drivers of Health      [facility]    Social Connections    [facility] from [facility], [facility]    Housing Stability         REVIEW OF SYSTEMS:   GENERAL: feels well otherwise, no unplanned weight change,  normal appetite  SKIN: no rashes or abnormal skin lesions  HEENT: See HPI.    LUNGS: denies shortness of breath or wheezing, See HPI  CARDIOVASCULAR: denies chest pain or palpitations   GI: denies N/V/C or abdominal pain  NEURO: + sinus headaches.  No numbness or tingling in face.    EXAM:   BP 90/60 (BP Location: Left arm, Patient Position: Sitting, Cuff Size: adult)   Pulse 90   Temp 98.1 °F (36.7 °C) (Temporal)   Resp 18   Ht 5' 2\" (1.575 m)   Wt 135 lb (61.2 kg)   SpO2 98%   BMI 24.69 kg/m²   GENERAL: well developed, well nourished,in no apparent  distress  SKIN: no rashes,no suspicious lesions  HEAD: atraumatic, normocephalic, + tenderness on palpation of maxillary sinuses  EYES: conjunctiva clear, EOM intact  EARS: TM's pearly, no bulging, no retraction, no fluid, bony landmarks present  NOSE: nostrils patent, clear nasal mucous, nasal mucosa reddened and boggy  THROAT: oral mucosa pink, moist. No visible dental caries. Posterior pharynx is not erythematous. no exudates.  NECK: supple, non-tender  LUNGS: clear to auscultation bilaterally, no wheezes or rhonchi. Breathing is non labored.  CARDIO: RRR without murmur  EXTREMITIES: no cyanosis, clubbing or edema  LYMPH:  no lymphadenopathy.    NEURO:  No focal deficits      ASSESSMENT AND PLAN:     Encounter Diagnosis   Name Primary?    Acute non-recurrent maxillary sinusitis Yes       No orders of the defined types were placed in this encounter.      Meds & Refills for this Visit:  Requested Prescriptions     Signed Prescriptions Disp Refills    amoxicillin clavulanate 875-125 MG Oral Tab 20 tablet 0     Sig: Take 1 tablet by mouth 2 (two) times daily for 10 days.           Risks, benefits, side effects of medication addressed and explained.    Patient Instructions   Take antibiotics with food and plenty of water.   Eat yogurt or take probiotic daily. (Probiotic-10 by [employer] is a good example of an OTC probiotic)  Make sure to finish the entire antibiotic treatment.  Increase fluids and rest.   Use OTC meds for comfort as needed--  Ibuprofen/Tylenol for fever/pain  Use Benadryl at bedtime to reduce drainage and promote rest.  Zyrtec/Claritin/Allegra in the AM to reduce nasal drainage without sedation.   Use saline nasal sprays to reduce congestion and thin secretions.   Use Delsym for cough.   Consider applying alan's vapo-rub or eucayptus oil to chest and feet at bedtime to reduce chest and nasal congestion.   Warm tea with honey, cough lozenges, vaporizers/steam etc.    Monitor symptoms and contact  the office if no better in 2-3 days.      The patient indicates understanding of these issues and agrees to the plan.

## 2024-10-25 NOTE — PATIENT INSTRUCTIONS
Take antibiotics with food and plenty of water.   Eat yogurt or take probiotic daily. (Probiotic-10 by "[employer] is a good example of an OTC probiotic)  Make sure to finish the entire antibiotic treatment.  Increase fluids and rest.   Use OTC meds for comfort as needed--  Ibuprofen/Tylenol for fever/pain  Use Benadryl at bedtime to reduce drainage and promote rest.  Zyrtec/Claritin/Allegra in the AM to reduce nasal drainage without sedation.   Use saline nasal sprays to reduce congestion and thin secretions.   Use Delsym for cough.   Consider applying alan's vapo-rub or eucayptus oil to chest and feet at bedtime to reduce chest and nasal congestion.   Warm tea with honey, cough lozenges, vaporizers/steam etc.    Monitor symptoms and contact the office if no better in 2-3 days.

## 2024-11-24 DIAGNOSIS — Z51.81 ENCOUNTER FOR THERAPEUTIC DRUG LEVEL MONITORING: ICD-10-CM

## 2024-11-24 DIAGNOSIS — E66.9 OBESITY, UNSPECIFIED: ICD-10-CM

## 2024-11-25 ENCOUNTER — PATIENT MESSAGE (OUTPATIENT)
Dept: INTERNAL MEDICINE CLINIC | Facility: CLINIC | Age: 54
End: 2024-11-25

## 2024-11-25 DIAGNOSIS — Z51.81 THERAPEUTIC DRUG MONITORING: Primary | ICD-10-CM

## 2024-11-25 DIAGNOSIS — E66.01 CLASS 3 SEVERE OBESITY WITHOUT SERIOUS COMORBIDITY WITH BODY MASS INDEX (BMI) OF 45.0 TO 49.9 IN ADULT, UNSPECIFIED OBESITY TYPE (HCC): ICD-10-CM

## 2024-11-25 DIAGNOSIS — E66.813 CLASS 3 SEVERE OBESITY WITHOUT SERIOUS COMORBIDITY WITH BODY MASS INDEX (BMI) OF 45.0 TO 49.9 IN ADULT, UNSPECIFIED OBESITY TYPE (HCC): ICD-10-CM

## 2024-11-25 RX ORDER — DIETHYLPROPION HYDROCHLORIDE 75 MG/1
1 TABLET, EXTENDED RELEASE ORAL EVERY MORNING
Qty: 30 TABLET | Refills: 0 | OUTPATIENT
Start: 2024-11-25

## 2024-11-25 NOTE — TELEPHONE ENCOUNTER
Requesting   Requested Prescriptions     Pending Prescriptions Disp Refills    Diethylpropion HCl ER 75 MG Oral Tablet 24 Hr 30 tablet 0     Sig: Take 1 tablet (75 mg total) by mouth every morning.       LOV: 09/02/2024  RTC: 01/16/25  Filled: 09/02/2024 #0 with 0 refills    Future Appointments   Date Time Provider Department Center   1/16/2025 10:00 AM Luz Maria Streeter MD [facility]   4/22/2025 12:00 PM Luz Maria Streeter MD [facility]

## 2024-11-26 RX ORDER — DIETHYLPROPION HYDROCHLORIDE 75 MG/1
1 TABLET, EXTENDED RELEASE ORAL EVERY MORNING
Qty: 30 TABLET | Refills: 0 | Status: SHIPPED | OUTPATIENT
Start: 2024-11-26

## 2025-01-08 DIAGNOSIS — Z51.81 THERAPEUTIC DRUG MONITORING: ICD-10-CM

## 2025-01-08 DIAGNOSIS — E66.813 CLASS 3 SEVERE OBESITY WITHOUT SERIOUS COMORBIDITY WITH BODY MASS INDEX (BMI) OF 45.0 TO 49.9 IN ADULT, UNSPECIFIED OBESITY TYPE (HCC): ICD-10-CM

## 2025-01-08 DIAGNOSIS — E66.01 CLASS 3 SEVERE OBESITY WITHOUT SERIOUS COMORBIDITY WITH BODY MASS INDEX (BMI) OF 45.0 TO 49.9 IN ADULT, UNSPECIFIED OBESITY TYPE (HCC): ICD-10-CM

## 2025-01-09 RX ORDER — DIETHYLPROPION HYDROCHLORIDE 75 MG/1
1 TABLET, EXTENDED RELEASE ORAL EVERY MORNING
Qty: 30 TABLET | Refills: 0 | Status: SHIPPED | OUTPATIENT
Start: 2025-01-09

## 2025-01-09 NOTE — TELEPHONE ENCOUNTER
Requesting   Requested Prescriptions     Pending Prescriptions Disp Refills    DIETHYLPROPION HCL ER 75 MG Oral Tablet 24 Hr [Pharmacy Med Name: DIETHYLPROPION ER 75 MG TABLET] 30 tablet 0     Sig: TAKE 1 TABLET BY MOUTH EVERY DAY IN THE MORNING       LOV: 9/11/24  RTC:   Last Relevant Labs:   Filled:  11/26/24 #30 with 0 refills    Future Appointments   Date Time Provider Department Center   1/16/2025  9:20 AM Luz Maria Streeter MD [facility]   1/20/2025  4:00 PM Nayla Espitia DO [facility] [place]   4/22/2025 12:00 PM Luz Maria Streeter MD [facility]

## 2025-01-13 ENCOUNTER — PATIENT MESSAGE (OUTPATIENT)
Dept: INTERNAL MEDICINE CLINIC | Facility: CLINIC | Age: 55
End: 2025-01-13

## 2025-01-15 NOTE — PROGRESS NOTES
HISTORY OF PRESENT ILLNESS  Chief Complaint   Patient presents with    Weight Check     Down 4        Charity Ward is a 54 year old female here for follow up in medical weight loss program.     Denies chest pain, shortness of breath, dizziness, blurred vision, headache, paresthesia, nausea/vomiting.   Progressing and down 4 lb from previous   Going in the direction   Eating more whole foods   Doing meal prep and making better meal choices       [facility] Follow Up    General Information  Success Moment: Lost weight  Challenging Moment: Progress slow  Nutrition Recall  Breakfast: [place] protein pancakes Lunch: Chicken breast cooked in 1 tsp   olive oil, corn   Dinner: Chicken breast, salad, croutons, 2 tb dressing Snacks: Protein   shake, chomp with cheese   Fluids: Coke zero, water Dining Out: 0   Exercise   Patient stated exercises # days/week: 2  Patient stated perceived level of   exertion: 2 Anaerobic Days: 2   Aerobic Days: 0   Patient stated average level of stress: 4  Sleep   Patient stated # hours uninterrupted sleep: 4   Patient stated feels   restful: No      Cause of disruption of sleep: Bathroom, to do list   Goals: Continue weight loss progress                  Wt Readings from Last 6 Encounters:   01/16/25 137 lb (62.1 kg)   10/25/24 135 lb (61.2 kg)   09/11/24 141 lb (64 kg)   04/30/24 138 lb (62.6 kg)   01/26/24 128 lb (58.1 kg)   10/18/23 125 lb (56.7 kg)            Breakfast Lunch Dinner Snacks Fluids   Reviewed             REVIEW OF SYSTEMS  GENERAL HEALTH: feels well otherwise, denied any fevers chills or night sweats   RESPIRATORY: denies shortness of breath   CARDIOVASCULAR: denies chest pain  GI: denies abdominal pain    EXAM  /72   Pulse 85   Resp 20   Ht 5' 2\" (1.575 m)   Wt 137 lb (62.1 kg)   BMI 25.06 kg/m²         GENERAL: well developed, well nourished,in no apparent distress, A/O x3  SKIN: no rashes,no suspicious lesions  HEENT: atraumatic, normocephalic, OP-clear, PERRL  NECK:  supple,no adenopathy  LUNGS: clear to auscultation bilaterally   CARDIO: RRR without murmur  GI: good BS's,NT/ND, no masses or HSM  EXTREMITIES: no cyanosis, no clubbing, no edema    Lab Results   Component Value Date    WBC 5.2 09/21/2024    RBC 4.82 09/21/2024    HGB 14.2 09/21/2024    HCT 42.4 09/21/2024    MCV 88.0 09/21/2024    MCH 29.5 09/21/2024    MCHC 33.5 09/21/2024    RDW 11.9 09/21/2024    .0 09/21/2024     Lab Results   Component Value Date    GLU 83 09/21/2024    BUN 12 09/21/2024    BUNCREA 20.0 03/07/2022    CREATSERUM 0.85 09/21/2024    ANIONGAP 6 09/21/2024    GFR >59 11/30/2009    GFRNAA 93 09/09/2011    GFRAA 108 09/09/2011    CA 9.7 09/21/2024    OSMOCALC 279 09/21/2024    ALKPHO 63 09/21/2024    AST 17 09/21/2024    ALT 11 09/21/2024    BILT 0.8 09/21/2024    TP 7.2 09/21/2024    ALB 4.4 09/21/2024    GLOBULIN 2.8 09/21/2024    AGRATIO 1.7 03/21/2015     (L) 09/21/2024    K 4.7 09/21/2024     09/21/2024    CO2 26.0 09/21/2024     Lab Results   Component Value Date     09/21/2024    A1C 5.4 09/21/2024     Lab Results   Component Value Date    CHOLEST 193 09/21/2024    TRIG 44 09/21/2024    HDL 64 (H) 09/21/2024     (H) 09/21/2024    VLDL 8 09/21/2024    TCHDLRATIO 2.4 09/09/2011    NONHDLC 129 09/21/2024     Lab Results   Component Value Date    T4F 1.5 09/21/2024    TSH 0.920 09/21/2024     Lab Results   Component Value Date    B12 581 09/21/2024    VITB12 478 09/09/2011     Lab Results   Component Value Date    VITD 36.2 09/21/2024       Current Outpatient Medications on File Prior to Visit   Medication Sig Dispense Refill    PREDNISONE OR Take by mouth.      progesterone 100 MG Oral Cap Take 1 capsule (100 mg total) by mouth nightly.      estradiol 0.05 MG/24HR Transdermal Patch Biweekly Place 1 patch onto the skin twice a week.      ubrogepant (UBRELVY) 100 MG Oral Tab Take 100 mg by mouth.      ergocalciferol 1.25 MG (64699 UT) Oral Cap Take 1 capsule  (50,000 Units total) by mouth every 7 days. 12 capsule 1    ondansetron 4 MG Oral Tablet Dispersible Take 1 tablet (4 mg total) by mouth every 8 (eight) hours as needed for Nausea. 10 tablet 0     No current facility-administered medications on file prior to visit.       ASSESSMENT  Analyzed weight data:       Diagnoses and all orders for this visit:    Encounter for therapeutic drug level monitoring    Class 3 severe obesity without serious comorbidity with body mass index (BMI) of 45.0 to 49.9 in adult, unspecified obesity type (HCC)    Fatigue, unspecified type    Other orders  -     Tirzepatide-Weight Management (ZEPBOUND) 7.5 MG/0.5ML Subcutaneous Solution Auto-injector; Inject 7.5 mg into the skin once a week for 4 doses.  -     Tirzepatide-Weight Management (ZEPBOUND) 10 MG/0.5ML Subcutaneous Solution Auto-injector; Inject 10 mg into the skin once a week.            PLAN  Initial consult 183 lb on 10/30/19  Down 4 lb   Down 46 lb   Total time spent on chart review, pre-charting, obtaining history, counseling, and educating, reviewing labs was 30 minutes.  Coverage for Wegovy due to insurance requirement guideline changes.  Trial of Zepbound, patient will pay out-of-pocket for medication reviewed dose conversion with medication.  Reviewed goals for health and wellness for new year.  Start working on more protein, fiber, fruits, vegetables  Nutrition: low carb diet/ recommended to eat breakfast daily/ regular protein intake  Medication use and side effects reviewed with patient.  Medication contraindications: n/a  Fitness resources given   Follow up with dietitian and psychologist as recommended.  Discussed the role of sleep and stress in weight management.  Counseled on comprehensive weight loss plan including attention to nutrition, exercise and behavior/stress management for success. See patient instruction below for more details.  Discussed strategies to overcome barriers to successful weight loss and  weight maintenance  FITTE: ACSM recommendations (150-300 minutes/ week in active weight loss)   Weight Loss consent to treat reviewed and signed     There are no Patient Instructions on file for this visit.    No follow-ups on file.    Patient verbalizes understanding.    Luz Maria Streeter MD

## 2025-01-16 ENCOUNTER — OFFICE VISIT (OUTPATIENT)
Dept: INTERNAL MEDICINE CLINIC | Facility: CLINIC | Age: 55
End: 2025-01-16
Payer: COMMERCIAL

## 2025-01-16 VITALS
SYSTOLIC BLOOD PRESSURE: 118 MMHG | HEART RATE: 85 BPM | WEIGHT: 137 LBS | BODY MASS INDEX: 25.21 KG/M2 | HEIGHT: 62 IN | RESPIRATION RATE: 20 BRPM | DIASTOLIC BLOOD PRESSURE: 72 MMHG

## 2025-01-16 DIAGNOSIS — R53.83 FATIGUE, UNSPECIFIED TYPE: ICD-10-CM

## 2025-01-16 DIAGNOSIS — E66.01 CLASS 3 SEVERE OBESITY WITHOUT SERIOUS COMORBIDITY WITH BODY MASS INDEX (BMI) OF 45.0 TO 49.9 IN ADULT, UNSPECIFIED OBESITY TYPE (HCC): ICD-10-CM

## 2025-01-16 DIAGNOSIS — Z51.81 ENCOUNTER FOR THERAPEUTIC DRUG LEVEL MONITORING: Primary | ICD-10-CM

## 2025-01-16 DIAGNOSIS — E66.813 CLASS 3 SEVERE OBESITY WITHOUT SERIOUS COMORBIDITY WITH BODY MASS INDEX (BMI) OF 45.0 TO 49.9 IN ADULT, UNSPECIFIED OBESITY TYPE (HCC): ICD-10-CM

## 2025-01-16 PROCEDURE — 99214 OFFICE O/P EST MOD 30 MIN: CPT | Performed by: INTERNAL MEDICINE

## 2025-01-16 RX ORDER — TIRZEPATIDE 10 MG/.5ML
10 INJECTION, SOLUTION SUBCUTANEOUS WEEKLY
Qty: 2 ML | Refills: 1 | Status: SHIPPED | OUTPATIENT
Start: 2025-01-16

## 2025-01-16 RX ORDER — TIRZEPATIDE 7.5 MG/.5ML
7.5 INJECTION, SOLUTION SUBCUTANEOUS WEEKLY
Qty: 2 ML | Refills: 1 | Status: SHIPPED | OUTPATIENT
Start: 2025-01-16 | End: 2025-02-07

## 2025-01-20 ENCOUNTER — OFFICE VISIT (OUTPATIENT)
Dept: FAMILY MEDICINE CLINIC | Facility: CLINIC | Age: 55
End: 2025-01-20
Payer: COMMERCIAL

## 2025-01-20 VITALS
HEIGHT: 61.4 IN | SYSTOLIC BLOOD PRESSURE: 116 MMHG | TEMPERATURE: 97 F | WEIGHT: 140 LBS | BODY MASS INDEX: 26.09 KG/M2 | DIASTOLIC BLOOD PRESSURE: 70 MMHG | HEART RATE: 89 BPM | OXYGEN SATURATION: 99 % | RESPIRATION RATE: 22 BRPM

## 2025-01-20 DIAGNOSIS — Z12.11 SCREEN FOR COLON CANCER: ICD-10-CM

## 2025-01-20 DIAGNOSIS — Z80.0 FAMILY HISTORY OF COLON CANCER IN FATHER: ICD-10-CM

## 2025-01-20 DIAGNOSIS — G43.009 MIGRAINE WITHOUT AURA AND WITHOUT STATUS MIGRAINOSUS, NOT INTRACTABLE: Primary | ICD-10-CM

## 2025-01-20 PROCEDURE — 99204 OFFICE O/P NEW MOD 45 MIN: CPT | Performed by: FAMILY MEDICINE

## 2025-01-20 RX ORDER — UBROGEPANT 100 MG/1
100 TABLET ORAL DAILY PRN
COMMUNITY

## 2025-01-20 RX ORDER — ELETRIPTAN HYDROBROMIDE 40 MG/1
40 TABLET, FILM COATED ORAL AS NEEDED
Qty: 7 TABLET | Refills: 0 | Status: SHIPPED | OUTPATIENT
Start: 2025-01-20

## 2025-01-20 RX ORDER — ONDANSETRON 8 MG/1
8 TABLET, ORALLY DISINTEGRATING ORAL EVERY 8 HOURS PRN
Qty: 7 TABLET | Refills: 1 | Status: SHIPPED | OUTPATIENT
Start: 2025-01-20

## 2025-01-20 RX ORDER — UBROGEPANT 100 MG/1
100 TABLET ORAL
Qty: 12 TABLET | Refills: 1 | Status: SHIPPED | OUTPATIENT
Start: 2025-01-20

## 2025-01-20 RX ORDER — ERENUMAB-AOOE 70 MG/ML
70 INJECTION SUBCUTANEOUS
Qty: 3 ML | Refills: 0 | Status: SHIPPED | OUTPATIENT
Start: 2025-01-20

## 2025-01-20 NOTE — PROGRESS NOTES
CHIEF COMPLAINT:   Chief Complaint   Patient presents with    New Patient     Establish care     Medication Request     Margines medication          HPI:     Charity Ward is a 54 year old female presents for establish care. Hx of Migraines since age 15. Pattern unchanged has 4-10 migraines depending on the trigger. Triggers- weather changes - spring and fall, stress, sleep deprivation.  Postmenopausal- denies vaginal bleeding. Tried imitrex 100mg had side effects palpitations, chest pressure and not effective in relieving in headaches. On Ulbrelvy for acute abortive and has complete relief of migraines without any side effects.  Not on preventative migraine medication.  Previously managed by Dr. Christopher, neurologist but neurologist left the practice.  Patient was given a sample of Ajovy during the fall when typically migraines will worsen with change of weather but was at the end of her season so never tried the medication.  Last brain imaging for nocturnal headaches was CT of the head 4/29/2023 that was negative for any acute intracranial abnormality.  Denies any hospitalizations for migraines.  Typically right sided unilateral throbbing without aura.  Headaches can last several hours.  Has run out of Ubrelvy and will be relieved for 3 to 4 hours with excetra and then return.  Patient's last headache was this morning took Excedrin Migraine OTC then took it 4 hours later since the headache returned and has mild headache now.  States can have vomiting and nausea if does not take abortive medication but not always.        Family History   Problem Relation Age of Onset    Lipids Father     Cancer Father         Colon Cancer    Hypertension Father     Cancer Mother         Lymphoma       HISTORY:  Past Medical History:    Acute pharyngitis    Acute upper respiratory infections of unspecified site    Allergic rhinitis    Bronchitis, not specified as acute or chronic    Hand tingling    Headache(784.0)    Mastodynia     Migraine, unspecified, without mention of intractable migraine without mention of status migrainosus    Oral aphthae    Other chest pain    Other malaise and fatigue    Personal history of pneumonia (recurrent)    Sebaceous cyst    Spasm of muscle    Unspecified sinusitis (chronic)    Viral warts, unspecified      Past Surgical History:   Procedure Laterality Date      04      Family History   Problem Relation Age of Onset    Lipids Father     Cancer Father         Colon Cancer    Hypertension Father     Cancer Mother         Lymphoma      Social History:   Social History     Socioeconomic History    Marital status:    Tobacco Use    Smoking status: Never     Passive exposure: Never    Smokeless tobacco: Never   Vaping Use    Vaping status: Never Used   Substance and Sexual Activity    Alcohol use: No    Drug use: No    Sexual activity: Yes     Partners: Male     Birth control/protection: Condom   Other Topics Concern    Caffeine Concern No    Stress Concern No    Weight Concern No    Special Diet No    Exercise No    Seat Belt Yes     Social Drivers of Health     Food Insecurity: No Food Insecurity (2025)    NCSS - Food Insecurity     Worried About Running Out of Food in the Last Year: No     Ran Out of Food in the Last Year: No   Transportation Needs: No Transportation Needs (2025)    NCSS - Transportation     Lack of Transportation: No    [facility]    Social Connections   Housing Stability: Not At Risk (2025)    NCSS - Housing/Utilities     Has Housing: Yes     Worried About Losing Housing: No     Unable to Get Utilities: No        Medications (Active prior to today's visit):  Current Outpatient Medications   Medication Sig Dispense Refill    ondansetron 8 MG Oral Tablet Dispersible Take 1 tablet (8 mg total) by mouth every 8 (eight) hours as needed for Nausea. 7 tablet 1    Eletriptan Hydrobromide 40 MG Oral  Tab Take 1 tablet (40 mg total) by mouth as needed. 7 tablet 0    erenumab-aooe (AIMOVIG) 70 MG/ML Subcutaneous Inject 1 mL (70 mg total) into the skin every 30 (thirty) days. 3 mL 0    progesterone 100 MG Oral Cap Take 1 capsule (100 mg total) by mouth nightly.      estradiol 0.05 MG/24HR Transdermal Patch Biweekly Place 1 patch onto the skin twice a week.      ondansetron 4 MG Oral Tablet Dispersible Take 1 tablet (4 mg total) by mouth every 8 (eight) hours as needed for Nausea. 10 tablet 0    Tirzepatide-Weight Management (ZEPBOUND) 7.5 MG/0.5ML Subcutaneous Solution Auto-injector Inject 7.5 mg into the skin once a week for 4 doses. 2 mL 1    Tirzepatide-Weight Management (ZEPBOUND) 10 MG/0.5ML Subcutaneous Solution Auto-injector Inject 10 mg into the skin once a week. 2 mL 1       Allergies:  Allergies[1]    PSFH elements reviewed from today and agreed except as otherwise stated in HPI.  PHYSICAL EXAM:   /70   Pulse 89   Temp 97.2 °F (36.2 °C) (Temporal)   Resp 22   Ht 5' 1.4\" (1.56 m)   Wt 140 lb (63.5 kg)   SpO2 99%   BMI 26.11 kg/m²   Vital signs reviewed.Appears stated age, well groomed.  Physical Exam   General: Well-nourished, well hydrated. No acute distress. No pallor. No tachypnea. Non toxic.  HEENT: normocephaly, atraumatic.  PERRL EOMI bilateral.  Unable to visualize retinas bilateral.  Sclera clear and non icteric bilaterally.  Oral pharynx clear without normal finding.  Moist mucous membranes.  Neck: supple. No adenopathy. No thyromegaly.   Heart: RRR without S3 or S4 murmur . Clear S1S2  Lungs: clear to auscultation bilaterally. No rales, rhonchi or wheezes. Good inspiratory and expiratory effort  Abdomen: soft, nontender, nondistended. NL bowel sounds.   Extremities: No cyanosis, clubbing, or edema bilaterally.   Skin: no acute rashes  NEURO: no focal deficits. AOx3.PERRL EOMI bilateral. CN II-XII grossly intact. NL gait. Normal u/LE bilateral DTR +2/4 and symmetric. Normal sensation  to touch intact.Normal finger to nose test. Normal muscle strength +5/5 b/l and equal and symmetric of upper/lower extremity. No pronator drift. Negative rhomberg test.    LABS      Free T4 09/21/2024 1.5     TSH 09/21/2024 0.920     Glucose 09/21/2024 83     Sodium 09/21/2024 135 (L)     Potassium 09/21/2024 4.7     Chloride 09/21/2024 103     CO2 09/21/2024 26.0     Anion Gap 09/21/2024 6     BUN 09/21/2024 12     Creatinine 09/21/2024 0.85     Calcium, Total 09/21/2024 9.7     Calculated Osmolality 09/21/2024 279     eGFR-Cr 09/21/2024 81     AST 09/21/2024 17     ALT 09/21/2024 11     Alkaline Phosphatase 09/21/2024 63     Bilirubin, Total 09/21/2024 0.8     Total Protein 09/21/2024 7.2     Albumin 09/21/2024 4.4     Globulin  09/21/2024 2.8     A/G Ratio 09/21/2024 1.6     Patient Fasting for CMP? 09/21/2024 Yes     WBC 09/21/2024 5.2     RBC 09/21/2024 4.82     HGB 09/21/2024 14.2     HCT 09/21/2024 42.4     PLT 09/21/2024 300.0     MCV 09/21/2024 88.0     MCH 09/21/2024 29.5     MCHC 09/21/2024 33.5     RDW 09/21/2024 11.9     Neutrophil Absolute Prel* 09/21/2024 2.98     Neutrophil Absolute 09/21/2024 2.98     Lymphocyte Absolute 09/21/2024 1.34     Monocyte Absolute 09/21/2024 0.65     Eosinophil Absolute 09/21/2024 0.18     Basophil Absolute 09/21/2024 0.03     Immature Granulocyte Abs* 09/21/2024 0.02     Neutrophil % 09/21/2024 57.2     Lymphocyte % 09/21/2024 25.8     Monocyte % 09/21/2024 12.5     Eosinophil % 09/21/2024 3.5     Basophil % 09/21/2024 0.6     Immature Granulocyte % 09/21/2024 0.4     Vitamin D, 25OH, Total 09/21/2024 36.2     Vitamin B12 09/21/2024 581     Folate (Folic Acid) 09/21/2024 17.4       REVIEWED THIS VISIT  ASSESSMENT/PLAN:   54 year old female with    1. Migraine without aura and without status migrainosus, not intractable  - ondansetron 8 MG Oral Tablet Dispersible; Take 1 tablet (8 mg total) by mouth every 8 (eight) hours as needed for Nausea.  Dispense: 7 tablet; Refill:  1  - Eletriptan Hydrobromide 40 MG Oral Tab; Take 1 tablet (40 mg total) by mouth as needed.  Dispense: 7 tablet; Refill: 0  -Start erenumab-aooe (AIMOVIG) 70 MG/ML Subcutaneous; Inject 1 mL (70 mg total) into the skin every 30 (thirty) days.  Dispense: 3 mL; Refill: 0 for prophylaxis  -Risk, benefits and side effects of Aimovig/erenumab-aooe discussed including risk of constipation, dizziness, hypertension, hypersensitivity, anaphylaxis, muscle cramps or spasms.  Recommend checking blood pressure 1 or 2 times a week.  Goal is less than 140/90.  Call if blood pressure is elevated ASAP.  If experiences any side effects of the medication,  will call the office immediately.  If severe or breathing issues,  then call 911.  -Use eletriptan for abortive in the interim while awaiting approval for Ubrelvy  Avoid using OTC Excedrin which has short half-life and causing rebound headaches  Intolerant of Imitrex sumatriptan  -Risk benefits and side effects of Eletriptan discussed-can have chest pain/pressure, throat pain or discomfort, jaw pain, dizziness, malaise, paresthesias fatigue, increased urination-call if any side effects or concerns.  Discussed must take the medication at the onset of the headache and not wait or will not be effective.  Max dose for oral is 2 tablets or 200 mg in 24 hours.  Do not take  eletriptan more than twice a week    2. Screen for colon cancer  3. Family history of colon cancer in father  - Gastro Referral - In Network  Overdue-has never had colonoscopy or Cologuard  Agrees to schedule ASAP    The patient and provider have a longitudinal relationship to address/treat the serious or complex condition as stated in this encounter.      Meds This Visit:  Requested Prescriptions     Signed Prescriptions Disp Refills    ondansetron 8 MG Oral Tablet Dispersible 7 tablet 1     Sig: Take 1 tablet (8 mg total) by mouth every 8 (eight) hours as needed for Nausea.    Eletriptan Hydrobromide 40 MG Oral Tab 7  tablet 0     Sig: Take 1 tablet (40 mg total) by mouth as needed.    erenumab-aooe (AIMOVIG) 70 MG/ML Subcutaneous 3 mL 0     Sig: Inject 1 mL (70 mg total) into the skin every 30 (thirty) days.       Health Maintenance:  Health Maintenance   Topic Date Due    Annual Physical  Never done    Colorectal Cancer Screening  Never done    DTaP,Tdap,and Td Vaccines (1 - Tdap) Never done    Pneumococcal Vaccine: 50+ Years (1 of 1 - PCV) Never done    Zoster Vaccines (1 of 2) Never done    COVID-19 Vaccine (4 - 2024-25 season) 09/01/2024    Mammogram  09/06/2025    Pap Smear  01/20/2027    Influenza Vaccine  Completed    Annual Depression Screening  Completed    Meningococcal B Vaccine  Aged Out         Patient/Caregiver Education: Patient/Caregiver Education: There are no barriers to learning. Medical education done.   Outcome: Patient verbalizes understanding. Patient is notified to call with any questions, comp lications, allergies, or worsening or changing symptoms.  Patient is to call with any side effects or complications from the treatments as a result of today.     Problem List:     Patient Active Problem List   Diagnosis    Other allergy, other than to medicinal agents    Migraine    Family history of colon cancer    Vitamin D deficiency    Dense breast tissue on mammogram       Imaging & Referrals:  None     1/20/2025  Nayla Espitia, DO      Patient understands plan and follow-up.  Return in about 6 weeks (around 3/3/2025) for virtual visit or in person, medication monitoring, sooner if needed..            [1]   Allergies  Allergen Reactions    Zithromax [Azithromycin Dihydrate] SWELLING

## 2025-02-21 DIAGNOSIS — G43.009 MIGRAINE WITHOUT AURA AND WITHOUT STATUS MIGRAINOSUS, NOT INTRACTABLE: ICD-10-CM

## 2025-02-24 RX ORDER — ELETRIPTAN HYDROBROMIDE 40 MG/1
40 TABLET, FILM COATED ORAL AS NEEDED
Qty: 6 TABLET | Refills: 0 | Status: SHIPPED | OUTPATIENT
Start: 2025-02-24

## 2025-02-24 NOTE — TELEPHONE ENCOUNTER
Refill(s) Requested:   Requested Prescriptions     Pending Prescriptions Disp Refills    Eletriptan Hydrobromide 40 MG Oral Tab [Pharmacy Med Name: ELETRIPTAN HBR 40 MG TABLET] 6 tablet 0     Sig: TAKE 1 TABLET (40 MG TOTAL) BY MOUTH AS NEEDED.     Medication Last Prescribed:  1/20/2025 7 tablet 0 refills     Has dose or medication been changed    since last prescription? *Review notes*    []  Yes       [x]  No     Last office visit: 1/20/2025 (in-office)  Visit date not found (virtual visit)     Relevant details from LOV note: Migraine without aura and without status migrainosus, not intractable  - ondansetron 8 MG Oral Tablet Dispersible; Take 1 tablet (8 mg total) by mouth every 8 (eight) hours as needed for Nausea.  Dispense: 7 tablet; Refill: 1  - Eletriptan Hydrobromide 40 MG Oral Tab; Take 1 tablet (40 mg total) by mouth as needed.  Dispense: 7 tablet; Refill: 0  -Start erenumab-aooe (AIMOVIG) 70 MG/ML Subcutaneous; Inject 1 mL (70 mg total) into the skin every 30 (thirty) days.  Dispense: 3 mL; Refill: 0 for prophylaxis  -Risk, benefits and side effects of Aimovig/erenumab-aooe discussed including risk of constipation, dizziness, hypertension, hypersensitivity, anaphylaxis, muscle cramps or spasms.  Recommend checking blood pressure 1 or 2 times a week.  Goal is less than 140/90.  Call if blood pressure is elevated ASAP.  If experiences any side effects of the medication,  will call the office immediately.  If severe or breathing issues,  then call 911.  -Use eletriptan for abortive in the interim while awaiting approval for Ubrelvy  Avoid using OTC Excedrin which has short half-life and causing rebound headaches  Intolerant of Imitrex sumatriptan  -Risk benefits and side effects of Eletriptan discussed-can have chest pain/pressure, throat pain or discomfort, jaw pain, dizziness, malaise, paresthesias fatigue, increased urination-call if any side effects or concerns.  Discussed must take the medication at  the onset of the headache and not wait or will not be effective.  Max dose for oral is 2 tablets or 200 mg in 24 hours.  Do not take  eletriptan more than twice a week     Relevant lab results: N/A    Patient was asked to follow-up in:   Return in about 6 weeks (around 3/3/2025) for virtual visit or in person, medication monitoring, sooner if needed..     Appointment due: March 2025    Future Appointments: Visit date not found     Action taken:  [x] Medication refilled per protocol

## 2025-03-17 ENCOUNTER — PATIENT MESSAGE (OUTPATIENT)
Dept: INTERNAL MEDICINE CLINIC | Facility: CLINIC | Age: 55
End: 2025-03-17

## 2025-03-17 DIAGNOSIS — E66.01 CLASS 3 SEVERE OBESITY WITHOUT SERIOUS COMORBIDITY WITH BODY MASS INDEX (BMI) OF 45.0 TO 49.9 IN ADULT, UNSPECIFIED OBESITY TYPE (HCC): Primary | ICD-10-CM

## 2025-03-17 DIAGNOSIS — E66.813 CLASS 3 SEVERE OBESITY WITHOUT SERIOUS COMORBIDITY WITH BODY MASS INDEX (BMI) OF 45.0 TO 49.9 IN ADULT, UNSPECIFIED OBESITY TYPE (HCC): Primary | ICD-10-CM

## 2025-03-18 RX ORDER — TIRZEPATIDE 10 MG/.5ML
10 INJECTION, SOLUTION SUBCUTANEOUS WEEKLY
Qty: 2 ML | Refills: 1 | Status: SHIPPED | OUTPATIENT
Start: 2025-03-18

## 2025-03-18 NOTE — TELEPHONE ENCOUNTER
Requesting zepbound increase to [facility]  LOV: 1/16/25  RTC: not noted  Last Relevant Labs: na  Filled: 1/16/25 #2 ml with 1 refills  7.5 mg    Future Appointments   Date Time Provider Department Center   4/7/2025  8:30 AM Natividad Ziegler APN [facility] [address]   7/17/2025 10:40 AM Luz Maria Streeter MD [facility]

## 2025-03-27 DIAGNOSIS — G43.009 MIGRAINE WITHOUT AURA AND WITHOUT STATUS MIGRAINOSUS, NOT INTRACTABLE: ICD-10-CM

## 2025-03-27 RX ORDER — ELETRIPTAN HYDROBROMIDE 40 MG/1
40 TABLET, FILM COATED ORAL AS NEEDED
Qty: 6 TABLET | Refills: 0 | Status: SHIPPED | OUTPATIENT
Start: 2025-03-27

## 2025-03-27 NOTE — TELEPHONE ENCOUNTER
Refill(s) Requested:   Requested Prescriptions     Pending Prescriptions Disp Refills    ELETRIPTAN HYDROBROMIDE 40 MG Oral Tab [Pharmacy Med Name: ELETRIPTAN HBR 40 MG TABLET] 6 tablet 0     Sig: TAKE 1 TABLET (40 MG TOTAL) BY MOUTH AS NEEDED.     Medication Last Prescribed:  2/24/2025 6 tablet 0 refills     Has dose or medication been changed    since last prescription? *Review notes*    []  Yes       [x]  No     Last office visit: 1/20/2025 (in-office)  Visit date not found (virtual visit)     Relevant details from LOV note: Migraine without aura and without status migrainosus, not intractable  - ondansetron 8 MG Oral Tablet Dispersible; Take 1 tablet (8 mg total) by mouth every 8 (eight) hours as needed for Nausea.  Dispense: 7 tablet; Refill: 1  - Eletriptan Hydrobromide 40 MG Oral Tab; Take 1 tablet (40 mg total) by mouth as needed.  Dispense: 7 tablet; Refill: 0  -Start erenumab-aooe (AIMOVIG) 70 MG/ML Subcutaneous; Inject 1 mL (70 mg total) into the skin every 30 (thirty) days.  Dispense: 3 mL; Refill: 0 for prophylaxis  -Risk, benefits and side effects of Aimovig/erenumab-aooe discussed including risk of constipation, dizziness, hypertension, hypersensitivity, anaphylaxis, muscle cramps or spasms.  Recommend checking blood pressure 1 or 2 times a week.  Goal is less than 140/90.  Call if blood pressure is elevated ASAP.  If experiences any side effects of the medication,  will call the office immediately.  If severe or breathing issues,  then call 911.  -Use eletriptan for abortive in the interim while awaiting approval for Ubrelvy  Avoid using OTC Excedrin which has short half-life and causing rebound headaches  Intolerant of Imitrex sumatriptan  -Risk benefits and side effects of Eletriptan discussed-can have chest pain/pressure, throat pain or discomfort, jaw pain, dizziness, malaise, paresthesias fatigue, increased urination-call if any side effects or concerns.  Discussed must take the medication at  the onset of the headache and not wait or will not be effective.  Max dose for oral is 2 tablets or 200 mg in 24 hours.  Do not take  eletriptan more than twice a week     Patient was asked to follow-up in:   Return in about 6 weeks (around 3/3/2025) for virtual visit or in person, medication monitoring, sooner if needed..     Appointment due: March 2025    Future Appointments: Visit date not found     Action taken:  [x] Medication refilled per protocol

## 2025-04-03 PROBLEM — R53.83 FATIGUE: Status: ACTIVE | Noted: 2025-04-03

## 2025-04-03 PROBLEM — F51.01 PRIMARY INSOMNIA: Status: ACTIVE | Noted: 2025-04-03

## 2025-04-03 PROBLEM — N95.1 PERIMENOPAUSAL: Status: ACTIVE | Noted: 2025-04-03

## 2025-04-03 PROBLEM — Z51.81 ENCOUNTER FOR THERAPEUTIC DRUG LEVEL MONITORING: Status: ACTIVE | Noted: 2025-04-03

## 2025-04-09 ENCOUNTER — OFFICE VISIT (OUTPATIENT)
Dept: INTERNAL MEDICINE CLINIC | Facility: CLINIC | Age: 55
End: 2025-04-09
Payer: COMMERCIAL

## 2025-04-09 VITALS
DIASTOLIC BLOOD PRESSURE: 76 MMHG | HEIGHT: 62 IN | WEIGHT: 138 LBS | SYSTOLIC BLOOD PRESSURE: 122 MMHG | RESPIRATION RATE: 18 BRPM | HEART RATE: 90 BPM | BODY MASS INDEX: 25.4 KG/M2 | OXYGEN SATURATION: 98 %

## 2025-04-09 DIAGNOSIS — E55.9 VITAMIN D DEFICIENCY: ICD-10-CM

## 2025-04-09 DIAGNOSIS — N95.1 PERIMENOPAUSAL: ICD-10-CM

## 2025-04-09 DIAGNOSIS — E66.01 CLASS 3 SEVERE OBESITY WITHOUT SERIOUS COMORBIDITY WITH BODY MASS INDEX (BMI) OF 45.0 TO 49.9 IN ADULT, UNSPECIFIED OBESITY TYPE (HCC): ICD-10-CM

## 2025-04-09 DIAGNOSIS — Z51.81 ENCOUNTER FOR THERAPEUTIC DRUG LEVEL MONITORING: Primary | ICD-10-CM

## 2025-04-09 DIAGNOSIS — E66.813 CLASS 3 SEVERE OBESITY WITHOUT SERIOUS COMORBIDITY WITH BODY MASS INDEX (BMI) OF 45.0 TO 49.9 IN ADULT, UNSPECIFIED OBESITY TYPE (HCC): ICD-10-CM

## 2025-04-09 PROCEDURE — 99214 OFFICE O/P EST MOD 30 MIN: CPT | Performed by: INTERNAL MEDICINE

## 2025-04-09 RX ORDER — PHENTERMINE HYDROCHLORIDE 37.5 MG/1
37.5 TABLET ORAL
Qty: 30 TABLET | Refills: 2 | Status: SHIPPED | OUTPATIENT
Start: 2025-04-09

## 2025-04-09 RX ORDER — TIRZEPATIDE 7.5 MG/.5ML
7.5 INJECTION, SOLUTION SUBCUTANEOUS WEEKLY
COMMUNITY
Start: 2025-02-20 | End: 2025-04-09

## 2025-04-09 NOTE — PROGRESS NOTES
HISTORY OF PRESENT ILLNESS  Chief Complaint   Patient presents with    Weight Check     Up 1 lb(1/16/2025)       Charity Ward is a 54 year old female here for follow up in medical weight loss program.   [facility] Follow Up    General Information  Success Moment: Working with a   Challenging Moment: Weight slow or stalled  Nutrition Recall  Breakfast: Protein bagel, 1 tsp butter Lunch: Hamburger, sweet potatoes,   cottage cheese, avacado   Dinner: Baked potatoe, broccoli, hamburger, cheese    Fluids: Water, diet coke, matcha Dining Out: 1   Exercise   Patient stated exercises # days/week: 2  Patient stated perceived level of   exertion: 3 Anaerobic Days: 2   Aerobic Days: 0   Patient stated average level of stress: 2  Sleep   Patient stated # hours uninterrupted sleep: 7   Patient stated feels   restful: Yes      Cause of disruption of sleep: Bathroom   Goals: Lose weight gained back              History of Present Illness  Charity Ward is a 54 year old female who presents with difficulty in weight management.    She has been experiencing ongoing challenges with weight management, characterized by a pattern of losing a few pounds and then regaining them, leading to a feeling of being 'stuck' in her current weight range. Her weight was 137 pounds on a previous visit, increased to 140 pounds at another doctor's visit, and is currently 138 pounds. She feels frustrated, as though she is back to where she started before seeking medical help.    She recently increased her medication dose from 7.5 mg to 10 mg, which has provided some improvement in controlling food cravings and 'food noise'. She is currently on her second dose of the 10 mg medication.    She has been working with a  and has started strength training, having completed three sessions so far. However, she has not yet noticed a change in how her clothes fit. Her dietary efforts include reducing snacking and increasing protein intake,  focusing on more structured meals. She recalls feeling happiest with her weight when she was around 125 to 118 pounds, noting a visible difference in her appearance from that time.    She is currently taking estradiol and progesterone, which have helped manage menopausal symptoms. No current menopausal symptoms are reported due to this medication regimen.    Wt Readings from Last 6 Encounters:   04/09/25 138 lb (62.6 kg)   01/20/25 140 lb (63.5 kg)   01/16/25 137 lb (62.1 kg)   10/25/24 135 lb (61.2 kg)   09/11/24 141 lb (64 kg)   04/30/24 138 lb (62.6 kg)              Breakfast Lunch Dinner Snacks Fluids   Reviewed           REVIEW OF SYSTEMS  GENERAL HEALTH: feels well otherwise, denied any fevers chills or night sweats   RESPIRATORY: denies shortness of breath   CARDIOVASCULAR: denies chest pain  GI: denies abdominal pain    EXAM  /76   Pulse 90   Resp 18   Ht 5' 2\" (1.575 m)   Wt 138 lb (62.6 kg)   SpO2 98%   BMI 25.24 kg/m²   GENERAL: well developed, well nourished,in no apparent distress, A/O x3  SKIN: no rashes,no suspicious lesions  HEENT: atraumatic, normocephalic, OP-clear, PERRL  NECK: supple,no adenopathy  LUNGS: clear to auscultation bilaterally   CARDIO: RRR without murmur  GI: good BS's,NT/ND, no masses or HSM  EXTREMITIES: no cyanosis, no clubbing, no edema    Lab Results   Component Value Date    WBC 5.2 09/21/2024    RBC 4.82 09/21/2024    HGB 14.2 09/21/2024    HCT 42.4 09/21/2024    MCV 88.0 09/21/2024    MCH 29.5 09/21/2024    MCHC 33.5 09/21/2024    RDW 11.9 09/21/2024    .0 09/21/2024     Lab Results   Component Value Date    GLU 83 09/21/2024    BUN 12 09/21/2024    BUNCREA 20.0 03/07/2022    CREATSERUM 0.85 09/21/2024    ANIONGAP 6 09/21/2024    GFR >59 11/30/2009    GFRNAA 93 09/09/2011    GFRAA 108 09/09/2011    CA 9.7 09/21/2024    OSMOCALC 279 09/21/2024    ALKPHO 63 09/21/2024    AST 17 09/21/2024    ALT 11 09/21/2024    BILT 0.8 09/21/2024    TP 7.2 09/21/2024    ALB  4.4 09/21/2024    GLOBULIN 2.8 09/21/2024    AGRATIO 1.7 03/21/2015     (L) 09/21/2024    K 4.7 09/21/2024     09/21/2024    CO2 26.0 09/21/2024     Lab Results   Component Value Date     09/21/2024    A1C 5.4 09/21/2024     Lab Results   Component Value Date    CHOLEST 193 09/21/2024    TRIG 44 09/21/2024    HDL 64 (H) 09/21/2024     (H) 09/21/2024    VLDL 8 09/21/2024    TCHDLRATIO 2.4 09/09/2011    NONHDLC 129 09/21/2024     Lab Results   Component Value Date    T4F 1.5 09/21/2024    TSH 0.920 09/21/2024     Lab Results   Component Value Date    B12 581 09/21/2024    VITB12 478 09/09/2011     Lab Results   Component Value Date    VITD 36.2 09/21/2024       Medications Ordered Prior to Encounter[1]    ASSESSMENT  Analyzed weight data:     Title    [facility] Information  Patient type: Lifestyle           Date of Initial Weight: 10/30/2019       Initial Weight: 183               Have any comorbidities improved since the last visit?:        Hypertension DM 2 CAD Dyslipidemia Osteoarthritis Sleep Apnea              Diagnoses and all orders for this visit:    Encounter for therapeutic drug level monitoring    Class 3 severe obesity without serious comorbidity with body mass index (BMI) of 45.0 to 49.9 in adult, unspecified obesity type (HCC)    Perimenopausal    Vitamin D deficiency        PLAN  Assessment & Plan  Obesity  Weight loss plateau despite Zepbound increase to 10 mg. Improvement in food noise and cravings noted. Considering metformin or further Zepbound increase. Phentermine considered for weight loss, energy, and focus.  - Consider adding metformin to enhance Zepbound's effectiveness.  - Discuss increasing Zepbound to 12 or 15 mg if needed but on vial program  - Prescribe phentermine 37.5 mg for one month to aid in weight loss and provide energy and focus.  -Had ecg in 2023 which reviewed  - Send phentermine prescription to [facility] pharmacy.  - Evaluate phentermine's effectiveness after  one month and decide on the next steps for Zepbound dosing.    Menopause  Estradiol and progesterone therapy effectively managing symptoms.  - Continue estradiol and progesterone therapy as currently prescribed.    Migraine  Not on long-acting migraine medication. Topamax discussed for migraine and craving control, considering potential side effects.  - Consider prescribing Topamax if migraine management and craving control become necessary.    Follow-up  Follow-up scheduled in July and October. Physical with Doctor Hernandez in May.  - Schedule a follow-up appointment in October to assess the effectiveness of the current treatment plan and make any necessary adjustments.    There are no Patient Instructions on file for this visit.    No follow-ups on file.    Patient verbalizes understanding.    Luz Maria Streeter MD           [1]   Current Outpatient Medications on File Prior to Visit   Medication Sig Dispense Refill    Tirzepatide-Weight Management (ZEPBOUND) 10 MG/0.5ML Subcutaneous Solution Inject 10 mg into the skin once a week. 2 mL 1    ondansetron 8 MG Oral Tablet Dispersible Take 1 tablet (8 mg total) by mouth every 8 (eight) hours as needed for Nausea. 7 tablet 1    ubrogepant (UBRELVY) 100 MG Oral Tab Take 100 mg by mouth daily as needed (PRN). Date given: 01/20/2025  Lot #: [id number]  NDC #: 4621-86159-62  Exp. date: 01/2026  Qty: 1  : N/A      estradiol 0.05 MG/24HR Transdermal Patch Biweekly Place 1 patch onto the skin twice a week.      ZEPBOUND 7.5 MG/0.5ML Subcutaneous Solution Auto-injector Inject 7.5 mg into the skin once a week. (Patient not taking: Reported on 4/9/2025)      erenumab-aooe (AIMOVIG) 70 MG/ML Subcutaneous Inject 1 mL (70 mg total) into the skin every 30 (thirty) days. (Patient not taking: Reported on 4/9/2025) 3 mL 0    progesterone 100 MG Oral Cap Take 1 capsule (100 mg total) by mouth nightly. (Patient not taking: Reported on 4/9/2025)       No current facility-administered  medications on file prior to visit.

## 2025-04-23 DIAGNOSIS — E66.813 CLASS 3 SEVERE OBESITY WITHOUT SERIOUS COMORBIDITY WITH BODY MASS INDEX (BMI) OF 45.0 TO 49.9 IN ADULT, UNSPECIFIED OBESITY TYPE: ICD-10-CM

## 2025-04-28 RX ORDER — TIRZEPATIDE 10 MG/.5ML
INJECTION, SOLUTION SUBCUTANEOUS
Qty: 2 ML | Refills: 1 | Status: SHIPPED | OUTPATIENT
Start: 2025-04-28

## 2025-04-28 NOTE — TELEPHONE ENCOUNTER
Requesting   Requested Prescriptions     Pending Prescriptions Disp Refills    ZEPBOUND 10 MG/0.5ML Subcutaneous Solution [Pharmacy Med Name: ZEPBOUND 10 MG/0.5ML SUBCUTANEOUS SOLUTION] 2 mL 1     Sig: INJECT 0.5 ML (10 MG) UNDER THE SKIN ONCE WEEKLY (0.5ML= 50 UNITS)       LOV: 03/18/25  RTC: 07/17/25  Filled: 03/18/25 #2 with 1 refills    Future Appointments   Date Time Provider Department Center   7/17/2025 10:40 AM Luz Maria Streeter MD [facility] [address]   10/20/2025  8:40 AM Luz Maria Streeter MD [facility] [address]

## 2025-05-23 ENCOUNTER — OFFICE VISIT (OUTPATIENT)
Dept: PAIN CLINIC | Facility: CLINIC | Age: 55
End: 2025-05-23
Payer: COMMERCIAL

## 2025-05-23 DIAGNOSIS — M25.529 ELBOW PAIN, UNSPECIFIED LATERALITY: ICD-10-CM

## 2025-05-23 DIAGNOSIS — G43.709 CHRONIC MIGRAINE WITHOUT AURA WITHOUT STATUS MIGRAINOSUS, NOT INTRACTABLE: Primary | ICD-10-CM

## 2025-05-23 PROCEDURE — 97811 ACUP 1/> W/O ESTIM EA ADD 15: CPT | Performed by: ACUPUNCTURIST

## 2025-05-23 PROCEDURE — 97810 ACUP 1/> WO ESTIM 1ST 15 MIN: CPT | Performed by: ACUPUNCTURIST

## 2025-05-30 ENCOUNTER — OFFICE VISIT (OUTPATIENT)
Dept: PAIN CLINIC | Facility: CLINIC | Age: 55
End: 2025-05-30
Payer: COMMERCIAL

## 2025-05-30 DIAGNOSIS — G43.709 CHRONIC MIGRAINE WITHOUT AURA WITHOUT STATUS MIGRAINOSUS, NOT INTRACTABLE: Primary | ICD-10-CM

## 2025-05-30 DIAGNOSIS — M25.529 ELBOW PAIN, UNSPECIFIED LATERALITY: ICD-10-CM

## 2025-05-30 NOTE — PROGRESS NOTES
Charity Ward is a 54 year old female No chief complaint on file..     Chief Complaint:    Migraines   Tennis Elbow    Self reported health status:     Patient reported severity of symptom(s) over the last 24 hours  With zero reporting no symptoms and 10 reporting the worst severity    Symptom 1: 5-9  Symptom 2: 1    Response to last TX:  Charity report she only had 2 HA this week as oppose to 4 per week. Her elbow pain is very low.    HPI: Charity is a pleasant 55 y/o coming in with complains of migraine headaches and tennis elbow. Charity has a long history of battling migraines. She states she can get 1 to 4 headaches per week. Triggers include work stress and stress in general. When she does get HA she primarily feels it above her right eyebrow and  sometimes on the right side of the neck extending to the occiput. Medication used includes Ubrogepant Ondansetron as needed. Her elbow pain has been going on for about 8 month and not sure exactly the cause. Charity also complains of low energy.     Objective (Pulse, Tongue, Vitals):    Tongue: Red, dry coat and scalloped    Pulse: Rapid     TCM Diagnosis: Channel Obstruction JEAN-PIERRE, GB  Plan of Care:  Acupuncture Therapy:      Set 1: Bilateral: Keys men    Set 2: Left: JEAN-PIERRE-3, GB-41, ST-36, LI-4, SJ-5    Set 3: GB-20, GB-21    Patient Goals: To reduce frequency of HA and reduce elbow pain. Improve quality of life.    Face Time:  28 minutes  Total Time:  58 minutes      Treatment performed by Genna BACA LAc. at [facility].    No follow-ups on file.    Genna Rodriguez L.AC  5/30/2025  1:27 AM

## 2025-06-06 ENCOUNTER — OFFICE VISIT (OUTPATIENT)
Dept: PAIN CLINIC | Facility: CLINIC | Age: 55
End: 2025-06-06
Payer: COMMERCIAL

## 2025-06-06 DIAGNOSIS — G43.709 CHRONIC MIGRAINE WITHOUT AURA WITHOUT STATUS MIGRAINOSUS, NOT INTRACTABLE: Primary | ICD-10-CM

## 2025-06-06 DIAGNOSIS — M25.529 ELBOW PAIN, UNSPECIFIED LATERALITY: ICD-10-CM

## 2025-06-06 PROCEDURE — 97811 ACUP 1/> W/O ESTIM EA ADD 15: CPT | Performed by: ACUPUNCTURIST

## 2025-06-06 PROCEDURE — 97810 ACUP 1/> WO ESTIM 1ST 15 MIN: CPT | Performed by: ACUPUNCTURIST

## 2025-06-06 NOTE — PROGRESS NOTES
Charity Ward is a 54 year old female No chief complaint on file..     Chief Complaint:    Migraines   Tennis Elbow    Self reported health status:     Patient reported severity of symptom(s) over the last 24 hours  With zero reporting no symptoms and 10 reporting the worst severity    Symptom 1: 5-7  Symptom 2: 1    Response to last TX:  Charity report she only had 1 mild HA and one strong one this week as oppose to 4-5 per week. The second headache she got yesterday and blames on pillow and poor positioning while sleeping. Her elbow pain is very low.    HPI: Charity is a pleasant 55 y/o coming in with complains of migraine headaches and tennis elbow. Charity has a long history of battling migraines. She states she can get 1 to 4 headaches per week. Triggers include work stress and stress in general. When she does get HA she primarily feels it above her right eyebrow and  sometimes on the right side of the neck extending to the occiput. Medication used includes Ubrogepant Ondansetron as needed. Her elbow pain has been going on for about 8 month and not sure exactly the cause. Charity also complains of low energy.     Objective (Pulse, Tongue, Vitals):    Tongue: Red    Pulse: Rapid     TCM Diagnosis: Channel Obstruction JEA-NPIERRE, GB  Plan of Care:  Acupuncture Therapy:      Set 1: Bilateral: Keys men    Set 2: Left: JEAN-PIERRE-3, GB-41, ST-38, LI-4, SJ-5    Set 3: Right: GB-20, GB-21    Patient Goals: To reduce frequency of HA and reduce elbow pain. Improve quality of life.    Face Time:  28 minutes  Total Time:  58 minutes      Treatment performed by Genna BACA LAc. at [facility].    No follow-ups on file.    Genna Rodriguez L.AC  6/6/2025  1:57 PM

## 2025-06-12 ENCOUNTER — OFFICE VISIT (OUTPATIENT)
Dept: FAMILY MEDICINE CLINIC | Facility: CLINIC | Age: 55
End: 2025-06-12
Payer: COMMERCIAL

## 2025-06-12 VITALS
RESPIRATION RATE: 18 BRPM | HEIGHT: 62 IN | WEIGHT: 134 LBS | BODY MASS INDEX: 24.66 KG/M2 | DIASTOLIC BLOOD PRESSURE: 70 MMHG | SYSTOLIC BLOOD PRESSURE: 100 MMHG | OXYGEN SATURATION: 98 % | HEART RATE: 99 BPM | TEMPERATURE: 97 F

## 2025-06-12 DIAGNOSIS — R22.42 MASS OF LOWER LEG, LEFT: Primary | ICD-10-CM

## 2025-06-12 DIAGNOSIS — Z12.31 SCREENING MAMMOGRAM, ENCOUNTER FOR: ICD-10-CM

## 2025-06-12 DIAGNOSIS — Z12.11 SCREEN FOR COLON CANCER: ICD-10-CM

## 2025-06-12 PROCEDURE — 99213 OFFICE O/P EST LOW 20 MIN: CPT | Performed by: FAMILY MEDICINE

## 2025-06-12 NOTE — PROGRESS NOTES
The following individual(s) verbally consented to be recorded using ambient AI listening technology and understand that they can each withdraw their consent to this listening technology at any point by asking the clinician to turn off or pause the recording:    Patient name: Charity Ward  Additional names:  none

## 2025-06-12 NOTE — PROGRESS NOTES
Subjective:   Charity Ward is a 54 year old female who presents for Lump (patient has noticed a lump in left lower calf)       History/Other:   History of Present Illness  Charity Ward is a 54 year old female who presents with a lump on her left posterior distal calf.    Approximately two weeks ago, she noticed a small, non-painful, non-movable lump on her left posterior distal calf, located under the dermis, possibly on the muscle, measuring approximately 6-7 millimeters. No associated symptoms such as pain, fever, chills, unexplained weight loss, headache, or dizziness.    She recalls a fall in early March while climbing onto a jacuzzi tub, resulting in bruising on her legs. She had a varicose vein removed from the same leg in January 2022.    Her family history is significant for colorectal cancer. She has not yet undergone a colonoscopy and expresses fear of being put under anesthesia.   Chief Complaint Reviewed and Verified  Nursing Notes Reviewed and   Verified  Tobacco Reviewed  Allergies Reviewed  Medications Reviewed    Medical History Reviewed  Surgical History Reviewed  OB Status Reviewed    Family History Reviewed  Social History Reviewed         Tobacco:  She has never smoked tobacco.    Current Medications[1]      Review of Systems:  Pertinent items are noted in HPI.      Objective:   /70   Pulse 99   Temp 97.4 °F (36.3 °C) (Temporal)   Resp 18   Ht 5' 2\" (1.575 m)   Wt 134 lb (60.8 kg)   SpO2 98%   BMI 24.51 kg/m²  Estimated body mass index is 24.51 kg/m² as calculated from the following:    Height as of this encounter: 5' 2\" (1.575 m).    Weight as of this encounter: 134 lb (60.8 kg).  Results  RADIOLOGY       Physical Exam  EXTREMITIES: Ultrasound: Left posterior distal calf, 6-7 mm hard, round mass, non-movable under dermis, possibly on muscle      General: Well-nourished, well hydrated. No acute distress. No pallor. No tachypnea. Non toxic.  HEENT: normocephaly,  atraumatic.  Sclera clear and non icteric bilaterally.  Oral pharynx clear without normal finding.  Moist mucous membranes.  Neck: supple. No adenopathy. No thyromegaly.   Heart: RRR without S3 or S4 murmur . Clear S1S2  Lungs: clear to auscultation bilaterally. No rales, rhonchi or wheezes. Good inspiratory and expiratory effort  Abdomen: soft, nontender, nondistended. NL bowel sounds.   Extremities: No cyanosis, clubbing, or edema bilaterally.   Skin: no acute rashes- left calf  Neuro: AOx3.  Normal gait        Assessment & Plan:   1. Mass of lower leg, left (Primary)  -     Ultrasound Left Extremity; Future; Expected date: 06/12/2025  -     Ultrasound Left Extremity  2. Screening mammogram, encounter for  -     [facility] TAMIKO 2D+3D SCREENING BILAT (CPT=77067/23547); Future; Expected date: 09/06/2025  3. Screen for colon cancer    Assessment & Plan  Right lower leg mass  Presents with a small, hard, round mass on the right lower leg, specifically the left posterior distal calf. The mass is approximately 6-7 millimeters in size, non-movable, and located under the dermis, possibly on the muscle. Noticed about two weeks ago and is not associated with pain. Differential diagnosis includes a benign cyst or other possibilities. Likely benign.  - Order ultrasound of the right lower leg to evaluate the mass.    Colorectal cancer screening  Has a family history of colon cancer and has not yet undergone a colonoscopy. Emphasized the importance of colorectal cancer screening for prevention. Expresses fear of anesthesia but reassured about the safety and quickness of the procedure, which takes about fifteen minutes and is performed under MAC anesthesia through IV.  - Encourage scheduling a colonoscopy with Dr. Mann.  - Provide printed information about the colonoscopy procedure.    Mammogram  Due for a mammogram in September. Assisted in scheduling the mammogram at a preferred location with better surgical oncologists and  facilities for breast procedures.  - Schedule mammogram for September 6, 2024, at [facility].    Pneumonia and shingles vaccinations  Due for pneumonia and shingles vaccinations. Advised that the shingles vaccine can cause illness and suggested scheduling it for a Friday during the winter.  - Advise scheduling pneumonia and shingles vaccinations, with shingles vaccine planned for a Friday in the winter.    Tetanus vaccination  Reports a past allergic reaction to the tetanus vaccine, characterized by fainting after returning home. Fainting later after the vaccine may not be directly related to the tetanus vaccine itself verus vasovagal reaction.  Patient had no signs of anaphylaxis tongue or lip swelling, breathing difficulty hives or rash.  - Document past reaction to tetanus vaccine as fainting after returning home.        Return if symptoms worsen or fail to improve- schedule physical 12/2025.        Nayla Espitia DO, 6/12/2025, 9:41 AM             [1]   Current Outpatient Medications   Medication Sig Dispense Refill    ZEPBOUND 10 MG/0.5ML Subcutaneous Solution INJECT 0.5 ML (10 MG) UNDER THE SKIN ONCE WEEKLY (0.5ML= 50 UNITS) 2 mL 1    Phentermine HCl 37.5 MG Oral Tab Take 1 tablet (37.5 mg total) by mouth every morning before breakfast. 30 tablet 2    ondansetron 8 MG Oral Tablet Dispersible Take 1 tablet (8 mg total) by mouth every 8 (eight) hours as needed for Nausea. 7 tablet 1    ubrogepant (UBRELVY) 100 MG Oral Tab Take 100 mg by mouth daily as needed (PRN). Date given: 01/20/2025  Lot #: [id number]  NDC #: 9704-03056-25  Exp. date: 01/2026  Qty: 1  : N/A      progesterone 100 MG Oral Cap Take 1 capsule (100 mg total) by mouth nightly.      estradiol 0.05 MG/24HR Transdermal Patch Biweekly Place 1 patch onto the skin twice a week.      erenumab-aooe (AIMOVIG) 70 MG/ML Subcutaneous Inject 1 mL (70 mg total) into the skin every 30 (thirty) days. (Patient not taking: Reported on 6/12/2025)  3 mL 0

## 2025-06-13 ENCOUNTER — OFFICE VISIT (OUTPATIENT)
Dept: PAIN CLINIC | Facility: CLINIC | Age: 55
End: 2025-06-13
Payer: COMMERCIAL

## 2025-06-13 DIAGNOSIS — G43.709 CHRONIC MIGRAINE WITHOUT AURA WITHOUT STATUS MIGRAINOSUS, NOT INTRACTABLE: Primary | ICD-10-CM

## 2025-06-13 PROCEDURE — 97811 ACUP 1/> W/O ESTIM EA ADD 15: CPT | Performed by: ACUPUNCTURIST

## 2025-06-13 PROCEDURE — 97810 ACUP 1/> WO ESTIM 1ST 15 MIN: CPT | Performed by: ACUPUNCTURIST

## 2025-06-13 NOTE — PROGRESS NOTES
Charity Ward is a 54 year old female No chief complaint on file..     Chief Complaint:    Migraines   Tennis Elbow    Self reported health status:     Patient reported severity of symptom(s) over the last 24 hours  With zero reporting no symptoms and 10 reporting the worst severity    Symptom 1: 5-7  Symptom 2: 1    Response to last TX:  Charity report the frequency of the headaches continue to decreased to 1 a week instead of 4-5 a week.  She states the intensity of headaches used to be 7-8 out of 10 but since starting acupuncture it has dropped to 4-5 out 10.     HPI: Charity is a pleasant 55 y/o coming in with complains of migraine headaches and tennis elbow. Charity has a long history of battling migraines. She states she can get 1 to 4 headaches per week. Triggers include work stress and stress in general. When she does get HA she primarily feels it above her right eyebrow and  sometimes on the right side of the neck extending to the occiput. Medication used includes Ubrogepant Ondansetron as needed. Her elbow pain has been going on for about 8 month and not sure exactly the cause. Charity also complains of low energy.     Objective (Pulse, Tongue, Vitals):    Tongue: Red    Pulse: Rapid     TCM Diagnosis: Channel Obstruction JEAN-PIERRE, GB  Plan of Care:  Acupuncture Therapy:      Set 1: Bilateral: Keys men    Set 2: Left: JEAN-PIERRE-3, GB-41, ST-38, LI-4, SJ-5    Set 3: Right: GB-20, GB-21, SI-12    Set 4: Bilateral: SJ-15    Note: will be starting herbal formula Katie Pancho Barrera 3 tablets 2x per day.    Patient Goals: To reduce frequency of HA and reduce elbow pain. Improve quality of life.    Face Time:  28 minutes  Total Time:  58 minutes      Treatment performed by Genna BACA LAc. at [facility].    No follow-ups on file.    Genna Rodriguez L.AC  6/13/2025  1:48 PM

## 2025-06-19 ENCOUNTER — OFFICE VISIT (OUTPATIENT)
Dept: PAIN CLINIC | Facility: CLINIC | Age: 55
End: 2025-06-19
Payer: COMMERCIAL

## 2025-06-19 DIAGNOSIS — G43.709 CHRONIC MIGRAINE WITHOUT AURA WITHOUT STATUS MIGRAINOSUS, NOT INTRACTABLE: Primary | ICD-10-CM

## 2025-06-19 PROCEDURE — 97811 ACUP 1/> W/O ESTIM EA ADD 15: CPT | Performed by: ACUPUNCTURIST

## 2025-06-19 PROCEDURE — 97810 ACUP 1/> WO ESTIM 1ST 15 MIN: CPT | Performed by: ACUPUNCTURIST

## 2025-06-20 NOTE — PROGRESS NOTES
Charity Ward is a 54 year old female No chief complaint on file..     Chief Complaint:    Migraines   Tennis Elbow    Self reported health status:     Patient reported severity of symptom(s) over the last 24 hours  With zero reporting no symptoms and 10 reporting the worst severity    Symptom 1: 5-7  Symptom 2: 1    Response to last TX:  Charity report the frequency of the headaches continue to decreased to 1 a week instead of 4-5 a week.  She states the intensity of headaches used to be 7-8 out of 10 but since starting acupuncture it has dropped to 3-4 out 10.     HPI: Charity is a pleasant 53 y/o coming in with complains of migraine headaches and tennis elbow. Charity has a long history of battling migraines. She states she can get 1 to 4 headaches per week. Triggers include work stress and stress in general. When she does get HA she primarily feels it above her right eyebrow and  sometimes on the right side of the neck extending to the occiput. Medication used includes Ubrogepant Ondansetron as needed. Her elbow pain has been going on for about 8 month and not sure exactly the cause. Charity also complains of low energy.     Objective (Pulse, Tongue, Vitals):    Tongue: Red    Pulse: Rapid     TCM Diagnosis: Channel Obstruction JEAN-PIERRE, GB  Plan of Care:  Acupuncture Therapy:      Set 1: Bilateral: Keys men    Set 2: Left: JEAN-PIERRE-3, GB-41, ST-38, LI-4, SJ-5, GB-34    Set 3: Right: GB-20, GB-21, SI-12    Set 4: Bilateral:  GB-20, GB-21    Note: will be starting herbal formula Katie Pancho Barrera 3 tablets 2x per day.    Patient Goals: To reduce frequency of HA and reduce elbow pain. Improve quality of life.    Face Time:  28 minutes  Total Time:  58 minutes      Treatment performed by Genna BACA LAc. at [facility].    No follow-ups on file.    Genna Rodriguez L.AC  6/20/2025  9:22 PM

## 2025-06-26 ENCOUNTER — OFFICE VISIT (OUTPATIENT)
Dept: PAIN CLINIC | Facility: CLINIC | Age: 55
End: 2025-06-26
Payer: COMMERCIAL

## 2025-06-26 DIAGNOSIS — M25.529 ELBOW PAIN, UNSPECIFIED LATERALITY: ICD-10-CM

## 2025-06-26 DIAGNOSIS — G43.709 CHRONIC MIGRAINE WITHOUT AURA WITHOUT STATUS MIGRAINOSUS, NOT INTRACTABLE: Primary | ICD-10-CM

## 2025-06-26 PROCEDURE — 97811 ACUP 1/> W/O ESTIM EA ADD 15: CPT | Performed by: ACUPUNCTURIST

## 2025-06-26 PROCEDURE — 97810 ACUP 1/> WO ESTIM 1ST 15 MIN: CPT | Performed by: ACUPUNCTURIST

## 2025-07-01 NOTE — PROGRESS NOTES
Charity Ward is a 54 year old female No chief complaint on file..     Chief Complaint:    Migraines   Tennis Elbow    Self reported health status:     Patient reported severity of symptom(s) over the last 24 hours  With zero reporting no symptoms and 10 reporting the worst severity    Symptom 1: 0  Symptom 2: 1    Response to last TX:  Charity is very happy to reports she not had any headache regardless of the weather change.   She has very happy. She also reports she has been on the herbal formula for 5 days with no issues.     HPI: Charity is a pleasant 55 y/o coming in with complains of migraine headaches and tennis elbow. Charity has a long history of battling migraines. She states she can get 1 to 4 headaches per week. Triggers include work stress and stress in general. When she does get HA she primarily feels it above her right eyebrow and  sometimes on the right side of the neck extending to the occiput. Medication used includes Ubrogepant Ondansetron as needed. Her elbow pain has been going on for about 8 month and not sure exactly the cause. Charity also complains of low energy.     Objective (Pulse, Tongue, Vitals):    Tongue: Red    Pulse: slightly    TCM Diagnosis: Channel Obstruction JEAN-PIERRE, GB  Plan of Care:  Acupuncture Therapy:      Set 1: Bilateral: Keys men    Set 2: Left: JEAN-PIERRE-3, GB-41, ST-38, LI-4, SJ-5, GB-34    Set 3: Right: GB-20, GB-21, SI-12    Set 4: Bilateral:  GB-20, GB-21    Note: will be starting herbal formula Katie Pancho Barrera 3 tablets 2x per day.    Patient Goals: To reduce frequency of HA and reduce elbow pain. Improve quality of life.    Face Time:  28 minutes  Total Time:  58 minutes      Treatment performed by Genna BACA LAc. at [facility].    No follow-ups on file.    Genna Rodriguez L.AC  6/30/2025  10:02 PM

## 2025-07-03 ENCOUNTER — OFFICE VISIT (OUTPATIENT)
Dept: PAIN CLINIC | Facility: CLINIC | Age: 55
End: 2025-07-03
Payer: COMMERCIAL

## 2025-07-03 DIAGNOSIS — G43.709 CHRONIC MIGRAINE WITHOUT AURA WITHOUT STATUS MIGRAINOSUS, NOT INTRACTABLE: Primary | ICD-10-CM

## 2025-07-03 PROCEDURE — 97810 ACUP 1/> WO ESTIM 1ST 15 MIN: CPT | Performed by: ACUPUNCTURIST

## 2025-07-03 PROCEDURE — 97811 ACUP 1/> W/O ESTIM EA ADD 15: CPT | Performed by: ACUPUNCTURIST

## 2025-07-06 NOTE — PROGRESS NOTES
Charity Ward is a 54 year old female No chief complaint on file..     Chief Complaint:    Migraines   Tennis Elbow    Self reported health status:     Patient reported severity of symptom(s) over the last 24 hours  With zero reporting no symptoms and 10 reporting the worst severity    Symptom 1: 0  Symptom 2: 1    Response to last TX:  Charity reports she had one headache only.   She also reports she has been on the herbal formula for 5 days with no issues. She feels the formula is helpful.    HPI: Charity is a pleasant 55 y/o coming in with complains of migraine headaches and tennis elbow. Charity has a long history of battling migraines. She states she can get 1 to 4 headaches per week. Triggers include work stress and stress in general. When she does get HA she primarily feels it above her right eyebrow and  sometimes on the right side of the neck extending to the occiput. Medication used includes Ubrogepant Ondansetron as needed. Her elbow pain has been going on for about 8 month and not sure exactly the cause. Charity also complains of low energy.     Objective (Pulse, Tongue, Vitals):    Tongue: Red    Pulse: slightly    TCM Diagnosis: Channel Obstruction JEAN-PIERRE, GB  Plan of Care:  Acupuncture Therapy:      Set 1: Bilateral:JEAN-PIERRE-3, LI-4, LI-11, Keys men    Set 2: Left: JEAN-PIERRE-3, GB-41, ST-38, LI-4, SJ-5, GB-34    Set 3: Right: GB-20, GB-21, SI-12    Set 4: Bilateral:  GB-20, GB-21    Note: will be starting herbal formula Katie Pancho Barrera 3 tablets 2x per day.    Patient Goals: To reduce frequency of HA and reduce elbow pain. Improve quality of life.    Face Time:  28 minutes  Total Time:  58 minutes      Treatment performed by Genna BACA LAc. at [facility].    No follow-ups on file.    Genna Rodriguez L.AC  7/5/2025  7:31 PM

## 2025-07-11 ENCOUNTER — OFFICE VISIT (OUTPATIENT)
Dept: PAIN CLINIC | Facility: CLINIC | Age: 55
End: 2025-07-11
Payer: COMMERCIAL

## 2025-07-11 DIAGNOSIS — G43.709 CHRONIC MIGRAINE WITHOUT AURA WITHOUT STATUS MIGRAINOSUS, NOT INTRACTABLE: Primary | ICD-10-CM

## 2025-07-11 PROCEDURE — 97811 ACUP 1/> W/O ESTIM EA ADD 15: CPT | Performed by: ACUPUNCTURIST

## 2025-07-11 PROCEDURE — 97810 ACUP 1/> WO ESTIM 1ST 15 MIN: CPT | Performed by: ACUPUNCTURIST

## 2025-07-11 NOTE — PROGRESS NOTES
Charity Ward is a 54 year old female No chief complaint on file..     Chief Complaint:    Migraines   Tennis Elbow    Self reported health status:     Patient reported severity of symptom(s) over the last 24 hours  With zero reporting no symptoms and 10 reporting the worst severity    Symptom 1: 0  Symptom 2: 1    Response to last TX:  Charity reports she had no headaches to report.   She feels the formula is helpful.    Because she is not having headaches anymore and the herbal formula is helping her too Charity and I agreed this is a good time to significantly reduce her acupuncture treatments.   I am releasing her from regular acupuncture treatments. She can come in once a month or as needed.   She was very grateful and happy with the outcome.    HPI: Charity is a pleasant 55 y/o coming in with complains of migraine headaches and tennis elbow. Charity has a long history of battling migraines. She states she can get 1 to 4 headaches per week. Triggers include work stress and stress in general. When she does get HA she primarily feels it above her right eyebrow and  sometimes on the right side of the neck extending to the occiput. Medication used includes Ubrogepant Ondansetron as needed. Her elbow pain has been going on for about 8 month and not sure exactly the cause. Charity also complains of low energy.     Objective (Pulse, Tongue, Vitals):    Tongue: Red    Pulse: slightly    TCM Diagnosis: Channel Obstruction LAISHA MOREJON  Plan of Care:  Acupuncture Therapy:      Set 1: Left: GB-41, GB-34, GB-33, LI-4, LI-11    Set 3: Right: GB-20, GB-21, SI-12    Set 4: Bilateral:  JEAN-PIERRE-3    Note: will be starting herbal formula Katie Pancho Barrera 3 tablets 2x per day.    Patient Goals: To reduce frequency of HA and reduce elbow pain. Improve quality of life.    Face Time:  28 minutes  Total Time:  58 minutes      Treatment performed by Genna BACA LAc. at [facility].    No follow-ups on file.    Genna Rodriguez  ROCK  7/11/2025  2:28 PM

## 2025-07-17 ENCOUNTER — OFFICE VISIT (OUTPATIENT)
Dept: INTERNAL MEDICINE CLINIC | Facility: CLINIC | Age: 55
End: 2025-07-17
Payer: COMMERCIAL

## 2025-07-17 VITALS
RESPIRATION RATE: 18 BRPM | OXYGEN SATURATION: 98 % | HEART RATE: 101 BPM | BODY MASS INDEX: 23.74 KG/M2 | HEIGHT: 62 IN | WEIGHT: 129 LBS | DIASTOLIC BLOOD PRESSURE: 76 MMHG | SYSTOLIC BLOOD PRESSURE: 112 MMHG

## 2025-07-17 DIAGNOSIS — G43.009 MIGRAINE WITHOUT AURA AND WITHOUT STATUS MIGRAINOSUS, NOT INTRACTABLE: ICD-10-CM

## 2025-07-17 DIAGNOSIS — E55.9 VITAMIN D DEFICIENCY: ICD-10-CM

## 2025-07-17 DIAGNOSIS — N95.1 PERIMENOPAUSAL: ICD-10-CM

## 2025-07-17 DIAGNOSIS — Z51.81 ENCOUNTER FOR THERAPEUTIC DRUG LEVEL MONITORING: Primary | ICD-10-CM

## 2025-07-17 DIAGNOSIS — E66.813 CLASS 3 SEVERE OBESITY WITHOUT SERIOUS COMORBIDITY WITH BODY MASS INDEX (BMI) OF 45.0 TO 49.9 IN ADULT, UNSPECIFIED OBESITY TYPE: ICD-10-CM

## 2025-07-17 PROCEDURE — 99214 OFFICE O/P EST MOD 30 MIN: CPT | Performed by: INTERNAL MEDICINE

## 2025-07-17 RX ORDER — ONDANSETRON 8 MG/1
8 TABLET, ORALLY DISINTEGRATING ORAL EVERY 8 HOURS PRN
Qty: 7 TABLET | Refills: 1 | Status: SHIPPED | OUTPATIENT
Start: 2025-07-17

## 2025-07-17 RX ORDER — PHENTERMINE HYDROCHLORIDE 37.5 MG/1
37.5 TABLET ORAL
Qty: 30 TABLET | Refills: 2 | Status: SHIPPED | OUTPATIENT
Start: 2025-07-17

## 2025-07-17 RX ORDER — PANCRELIPASE LIPASE, PANCRELIPASE PROTEASE, PANCRELIPASE AMYLASE 40000; 126000; 168000 [USP'U]/1; [USP'U]/1; [USP'U]/1
3 CAPSULE, DELAYED RELEASE ORAL
Qty: 540 CAPSULE | Refills: 1 | Status: SHIPPED | OUTPATIENT
Start: 2025-07-17 | End: 2025-08-16

## 2025-07-17 RX ORDER — TIRZEPATIDE 10 MG/.5ML
10 INJECTION, SOLUTION SUBCUTANEOUS WEEKLY
Qty: 2 ML | Refills: 2 | Status: SHIPPED | OUTPATIENT
Start: 2025-07-17

## 2025-07-17 RX ORDER — TIRZEPATIDE 10 MG/.5ML
10 INJECTION, SOLUTION SUBCUTANEOUS WEEKLY
Qty: 2 ML | Refills: 2 | Status: SHIPPED | OUTPATIENT
Start: 2025-07-17 | End: 2025-07-17

## 2025-07-17 NOTE — PROGRESS NOTES
HISTORY OF PRESENT ILLNESS  Chief Complaint   Patient presents with    Weight Check     Down 9 lb  4/09/25       Charity Ward is a 54 year old female here for follow up in medical weight loss program.   [facility] Follow Up    General Information  Success Moment: Lost weight  Challenging Moment: Sleep  Nutrition Recall  Breakfast: Egg, cheese, sourdough bread Lunch: 1/2 checkenncaesar salad   wrap   Dinner: [place], rice, broccoli Snacks: Protein bar   Fluids: Water, sanpellegrino zero Dining Out: 1   Exercise   Patient stated exercises # days/week: 2  Patient stated perceived level of   exertion: 3 Anaerobic Days: 2   Aerobic Days: 0   Patient stated average level of stress: 8  Sleep   Patient stated # hours uninterrupted sleep: 5   Patient stated feels   restful: No      Cause of disruption of sleep: Stress   Goals: Get back to goal weight              History of Present Illness  Charity Ward is a 54 year old female who presents with nausea associated with Zephra medication.    She experiences nausea within the first 24 hours of taking Zepbound, describing it as tolerable but persistent, with occasional vomiting. She uses Zofran as needed for nausea relief.    She has been experiencing a cycle of migraines, having four to five episodes per week, which she finds disruptive. She attributes the migraines to stress, weather, or other life factors rather than exercise. Acupuncture has been beneficial in managing these migraines.    She is currently taking phentermine but not on a daily basis, opting to spread out the usage. She fills her prescription at [facility].    Her nutrition is generally healthy, but she acknowledges poor hydration, which she is attempting to improve with water delivery services. She uses reverse osmosis filtered water.    Wt Readings from Last 6 Encounters:   07/17/25 129 lb (58.5 kg)   06/12/25 134 lb (60.8 kg)   04/09/25 138 lb (62.6 kg)   01/20/25 140 lb (63.5 kg)   01/16/25 137 lb (62.1 kg)   10/25/24  135 lb (61.2 kg)              Breakfast Lunch Dinner Snacks Fluids   Reviewed           REVIEW OF SYSTEMS  GENERAL HEALTH: feels well otherwise, denied any fevers chills or night sweats   RESPIRATORY: denies shortness of breath   CARDIOVASCULAR: denies chest pain  GI: denies abdominal pain    EXAM  /76   Pulse 101   Resp 18   Ht 5' 2\" (1.575 m)   Wt 129 lb (58.5 kg)   SpO2 98%   BMI 23.59 kg/m²   GENERAL: well developed, well nourished,in no apparent distress, A/O x3  SKIN: no rashes,no suspicious lesions  HEENT: atraumatic, normocephalic, OP-clear, PERRL  NECK: supple,no adenopathy  LUNGS: clear to auscultation bilaterally   CARDIO: RRR without murmur  GI: good BS's,NT/ND, no masses or HSM  EXTREMITIES: no cyanosis, no clubbing, no edema    Lab Results   Component Value Date    WBC 5.2 09/21/2024    RBC 4.82 09/21/2024    HGB 14.2 09/21/2024    HCT 42.4 09/21/2024    MCV 88.0 09/21/2024    MCH 29.5 09/21/2024    MCHC 33.5 09/21/2024    RDW 11.9 09/21/2024    .0 09/21/2024     Lab Results   Component Value Date    GLU 83 09/21/2024    BUN 12 09/21/2024    BUNCREA 20.0 03/07/2022    CREATSERUM 0.85 09/21/2024    ANIONGAP 6 09/21/2024    GFR >59 11/30/2009    GFRNAA 93 09/09/2011    GFRAA 108 09/09/2011    CA 9.7 09/21/2024    OSMOCALC 279 09/21/2024    ALKPHO 63 09/21/2024    AST 17 09/21/2024    ALT 11 09/21/2024    BILT 0.8 09/21/2024    TP 7.2 09/21/2024    ALB 4.4 09/21/2024    GLOBULIN 2.8 09/21/2024    AGRATIO 1.7 03/21/2015     (L) 09/21/2024    K 4.7 09/21/2024     09/21/2024    CO2 26.0 09/21/2024     Lab Results   Component Value Date     09/21/2024    A1C 5.4 09/21/2024     Lab Results   Component Value Date    CHOLEST 193 09/21/2024    TRIG 44 09/21/2024    HDL 64 (H) 09/21/2024     (H) 09/21/2024    VLDL 8 09/21/2024    TCHDLRATIO 2.4 09/09/2011    NONHDLC 129 09/21/2024     Lab Results   Component Value Date    T4F 1.5 09/21/2024    TSH 0.920 09/21/2024      Lab Results   Component Value Date    B12 581 09/21/2024    VITB12 478 09/09/2011     Lab Results   Component Value Date    VITD 36.2 09/21/2024       Medications Ordered Prior to Encounter[1]    ASSESSMENT  Analyzed weight data:  Weight Calculations  Today's Weight: 129 lbs  [facility] Information  Patient type: Lifestyle   Date of Initial Weight: 10/30/2019 Initial Weight: 183        Have any comorbidities improved since the last visit?   Hypertension DM 2 Dyslipidemia Osteoarthritis Sleep Apnea                    Diagnoses and all orders for this visit:    Encounter for therapeutic drug level monitoring  -     Tirzepatide-Weight Management (ZEPBOUND) 10 MG/0.5ML Subcutaneous Solution; Inject 10 mg into the skin once a week.  -     Phentermine HCl 37.5 MG Oral Tab; Take 1 tablet (37.5 mg total) by mouth every morning before breakfast.    Perimenopausal  -     Tirzepatide-Weight Management (ZEPBOUND) 10 MG/0.5ML Subcutaneous Solution; Inject 10 mg into the skin once a week.  -     Phentermine HCl 37.5 MG Oral Tab; Take 1 tablet (37.5 mg total) by mouth every morning before breakfast.    Class 3 severe obesity without serious comorbidity with body mass index (BMI) of 45.0 to 49.9 in adult, unspecified obesity type  -     Discontinue: Tirzepatide-Weight Management (ZEPBOUND) 10 MG/0.5ML Subcutaneous Solution; Inject 10 mg into the skin once a week.  -     Tirzepatide-Weight Management (ZEPBOUND) 10 MG/0.5ML Subcutaneous Solution; Inject 10 mg into the skin once a week.  -     Phentermine HCl 37.5 MG Oral Tab; Take 1 tablet (37.5 mg total) by mouth every morning before breakfast.    Migraine without aura and without status migrainosus, not intractable  -     Tirzepatide-Weight Management (ZEPBOUND) 10 MG/0.5ML Subcutaneous Solution; Inject 10 mg into the skin once a week.  -     ondansetron 8 MG Oral Tablet Dispersible; Take 1 tablet (8 mg total) by mouth every 8 (eight) hours as needed for Nausea.    Vitamin D  deficiency  -     Phentermine HCl 37.5 MG Oral Tab; Take 1 tablet (37.5 mg total) by mouth every morning before breakfast.    Other orders  -     Pancrelipase, Lip-Prot-Amyl, (ZENPEP) 96336-038158 units Oral Cap DR Particles; Take 3 capsules by mouth 3 (three) times daily before meals.        PLAN  Assessment & Plan  Nausea due to medication  Nausea and occasional vomiting occur within 24 hours of taking maximum dose of Tirzepatide. Dose increase reconsidered due to nausea.  - Prescribed Zenpep to be taken the day before, the day of, and the day after Tirzepatide injection.  - Refilled Ondansetron for emergency nausea management.  - Advised against increasing Tirzepatide dose until nausea is managed.  - Encouraged increased hydration.    Migraine  Migraine cycle occurs four to five times a week, possibly triggered by stress, life events, or weather changes. Acupuncture effective.  - Continue acupuncture.  - Encourage resumption of physical activity as tolerated.    Obesity - continue zepbound 10 mg     Vit D staable    There are no Patient Instructions on file for this visit.    No follow-ups on file.    Patient verbalizes understanding.    Luz Maria Streeter MD           [1]   Current Outpatient Medications on File Prior to Visit   Medication Sig Dispense Refill    ubrogepant (UBRELVY) 100 MG Oral Tab Take 100 mg by mouth daily as needed (PRN). Date given: 01/20/2025  Lot #: [id number]  NDC #: 3702-20676-49  Exp. date: 01/2026  Qty: 1  : N/A      progesterone 100 MG Oral Cap Take 1 capsule (100 mg total) by mouth nightly.      estradiol 0.05 MG/24HR Transdermal Patch Biweekly Place 1 patch onto the skin twice a week.      erenumab-aooe (AIMOVIG) 70 MG/ML Subcutaneous Inject 1 mL (70 mg total) into the skin every 30 (thirty) days. (Patient not taking: Reported on 6/12/2025) 3 mL 0     No current facility-administered medications on file prior to visit.

## 2025-07-18 DIAGNOSIS — G43.009 MIGRAINE WITHOUT AURA, NOT INTRACTABLE, WITHOUT STATUS MIGRAINOSUS: ICD-10-CM

## 2025-07-24 RX ORDER — UBROGEPANT 100 MG/1
100 TABLET ORAL DAILY PRN
Qty: 30 TABLET | Refills: 0 | Status: CANCELLED | OUTPATIENT
Start: 2025-07-24

## 2025-07-24 RX ORDER — UBROGEPANT 100 MG/1
1 TABLET ORAL DAILY PRN
Qty: 10 TABLET | Refills: 1 | OUTPATIENT
Start: 2025-07-24

## 2025-08-01 RX ORDER — UBROGEPANT 100 MG/1
100 TABLET ORAL DAILY PRN
Qty: 30 TABLET | Refills: 0 | Status: SHIPPED | OUTPATIENT
Start: 2025-08-01

## 2025-08-28 ENCOUNTER — OFFICE VISIT (OUTPATIENT)
Dept: PAIN CLINIC | Facility: CLINIC | Age: 55
End: 2025-08-28

## 2025-08-28 DIAGNOSIS — G44.209 TENSION HEADACHE: Primary | ICD-10-CM

## 2025-08-28 PROCEDURE — 97810 ACUP 1/> WO ESTIM 1ST 15 MIN: CPT | Performed by: ACUPUNCTURIST

## 2025-08-28 PROCEDURE — 97811 ACUP 1/> W/O ESTIM EA ADD 15: CPT | Performed by: ACUPUNCTURIST

## (undated) NOTE — LETTER
[address], [address] [facility], [facility], [address] [address] [place] [address] [phone]            December 4, 2019      [address] [id number]      Dear Ms. Luca heath

## (undated) NOTE — MR AVS SNAPSHOT
[facility] [address] [facility]  [address] [facility]  [address] [facility] [address] [phone]               Thank you for choosing us for your health care visit with LARA Cavanaugh. We are glad to serve you and happy to provide you with this summary of your visit.   Please h · Failure of normal vision to return within 24-48 hours. © 7495-6412 The [address] [facility], [address] [place] [address] Ashley. All rights reserved. This information is not intended as a substitute for professional medical care.  Always follow y - [address], [phone], [address] [facility] [place]  [address]     Phone:  [phone]    - Tobramycin Sulfate 0.3 % Soln            Follow-up Instructions     Return if symptoms worsen or fail to improve.          My[employer] active are less likely to develop some chronic diseases than adults who are inactive.      HOW TO GET STARTED: HOW TO STAY MOTIVATED:   Start activities slowly and build up over time Do what you like   Get your heart pumping – brisk walking, biking, swimmin

## (undated) NOTE — LETTER
Patient Name: Palomo Gonzalez  YOB: 1970          MRN :  [id number]  Date:  11/1/2023  Referring Physician:  Tarik Valenzuelakeeper    Discharge Summary  Pt has attended 10 visits in Physical Therapy. Vijaya Malave reports feeling 90% improvement in her pain and headaches. Reports cervicogenic headaches have decreased to less than 1/week. She still has about 2 headaches a week that are not related to her cervical spine and she is working with a neurologist to help with those headaches. She is sleeping without waking due to pain and able to complete ADLs without neck pain or headaches. She has met all goals at this time and will be discharged from PT to a HEP. Objective:   Neck Disability Index Score  Score: 14 % (9/11/2023 10:59 AM)  Score: 4% 11/1/2023    Cervical AROM: (* denotes performed with pain)  Flexion: 45 deg  Extension: 60 deg  Sidebending: R 40 deg; L 40 deg  Rotation: R 90 deg; L 90 deg     Accessory motion: thoracic mobility: WNL  Palpation: no significant tenderness on palpation of right upper trap and scapular paraspinals     Strength: (* denotes performed with pain)  UE/Scapular   Shoulder Flex: R 5/5, L 5/5  Shoulder ABD (C5): R 5/5, L 5/5  Shoulder ER/IR: R/L: 5/5     Rhomboids: R 4+/5, L 4+/5  Mid trap: R 4+/5; L 4+/5      Flexibility:   UE/Scapular   Upper Trap: R WNL; L WNL  Levator Scap: R WNL; L WNL  Pec Major: R/L mild restrictions       Goals:   (to be met in 10 visits)   Pt will have improved thoracic PA mobility to WNL to improve cervical ROM as well as promote upright posturing and decreased pain with computer work. Met   Pt will report decreased frequency of headaches to <1x/week. Met  Pt will improve postural strength (mid trap) to 4+/5 to promote improved upright posturing and decreased pain with working. Met   PT to report ability to sleep without waking due to pain. Met    Pt will be independent and compliant with comprehensive HEP to maintain progress achieved in PT.  Met Plan: Recommend to discharge patient from PT to HEP. Patient/Family/Caregiver was advised of these findings, precautions, and treatment options and has agreed to actively participate in planning and for this course of care. Thank you for your referral. If you have any questions, please contact me at Dept: [phone]. Sincerely,  Electronically signed by therapist: Maximino Schaefer PT    Physician's certification required: No  Please co-sign or sign and return this letter via fax as soon as possible to [phone]. I certify the need for these services furnished under this plan of treatment and while under my care. X___________________________________________________ Date____________________                 21st Century Cures Act Notice to Patient: Medical documents like this are made available to patients in the interest of transparency. However, be advised this is a medical document and it is intended as [id number] communication between your medical providers. This medical document may contain abbreviations, assessments, medical data, and results or other terms that are unfamiliar. Medical documents are intended to carry relevant information, facts as evident, and the clinical opinion of the practitioner. As such, this medical document may be written in language that appears blunt or direct. You are encouraged to contact your medical provider and/or [facility] if you have any questions about this medical document.